# Patient Record
Sex: FEMALE | Race: ASIAN | NOT HISPANIC OR LATINO | URBAN - METROPOLITAN AREA
[De-identification: names, ages, dates, MRNs, and addresses within clinical notes are randomized per-mention and may not be internally consistent; named-entity substitution may affect disease eponyms.]

---

## 2019-05-02 ENCOUNTER — EMERGENCY (EMERGENCY)
Facility: HOSPITAL | Age: 24
LOS: 1 days | Discharge: HOME | End: 2019-05-02
Attending: STUDENT IN AN ORGANIZED HEALTH CARE EDUCATION/TRAINING PROGRAM | Admitting: STUDENT IN AN ORGANIZED HEALTH CARE EDUCATION/TRAINING PROGRAM
Payer: MEDICAID

## 2019-05-02 VITALS
OXYGEN SATURATION: 100 % | SYSTOLIC BLOOD PRESSURE: 109 MMHG | RESPIRATION RATE: 14 BRPM | DIASTOLIC BLOOD PRESSURE: 71 MMHG | WEIGHT: 106.04 LBS | TEMPERATURE: 98 F | HEART RATE: 85 BPM

## 2019-05-02 PROBLEM — Z00.00 ENCOUNTER FOR PREVENTIVE HEALTH EXAMINATION: Status: ACTIVE | Noted: 2019-05-02

## 2019-05-02 LAB
ALBUMIN SERPL ELPH-MCNC: 4.3 G/DL — SIGNIFICANT CHANGE UP (ref 3.3–5)
ALP SERPL-CCNC: 72 U/L — SIGNIFICANT CHANGE UP (ref 40–120)
ALT FLD-CCNC: 10 U/L — SIGNIFICANT CHANGE UP (ref 4–33)
ANION GAP SERPL CALC-SCNC: 11 MMO/L — SIGNIFICANT CHANGE UP (ref 7–14)
APTT BLD: 28.3 SEC — SIGNIFICANT CHANGE UP (ref 27.5–36.3)
AST SERPL-CCNC: 14 U/L — SIGNIFICANT CHANGE UP (ref 4–32)
BILIRUB SERPL-MCNC: < 0.2 MG/DL — LOW (ref 0.2–1.2)
BLD GP AB SCN SERPL QL: NEGATIVE — SIGNIFICANT CHANGE UP
BUN SERPL-MCNC: 13 MG/DL — SIGNIFICANT CHANGE UP (ref 7–23)
CALCIUM SERPL-MCNC: 9.3 MG/DL — SIGNIFICANT CHANGE UP (ref 8.4–10.5)
CHLORIDE SERPL-SCNC: 105 MMOL/L — SIGNIFICANT CHANGE UP (ref 98–107)
CO2 SERPL-SCNC: 25 MMOL/L — SIGNIFICANT CHANGE UP (ref 22–31)
CREAT SERPL-MCNC: 0.54 MG/DL — SIGNIFICANT CHANGE UP (ref 0.5–1.3)
GLUCOSE SERPL-MCNC: 72 MG/DL — SIGNIFICANT CHANGE UP (ref 70–99)
HCG SERPL-ACNC: < 5 MIU/ML — SIGNIFICANT CHANGE UP
HCT VFR BLD CALC: 36.2 % — SIGNIFICANT CHANGE UP (ref 34.5–45)
HGB BLD-MCNC: 11.2 G/DL — LOW (ref 11.5–15.5)
INR BLD: 0.97 — SIGNIFICANT CHANGE UP (ref 0.88–1.17)
MCHC RBC-ENTMCNC: 27.9 PG — SIGNIFICANT CHANGE UP (ref 27–34)
MCHC RBC-ENTMCNC: 30.9 % — LOW (ref 32–36)
MCV RBC AUTO: 90 FL — SIGNIFICANT CHANGE UP (ref 80–100)
NRBC # FLD: 0 K/UL — SIGNIFICANT CHANGE UP (ref 0–0)
PLATELET # BLD AUTO: 297 K/UL — SIGNIFICANT CHANGE UP (ref 150–400)
PMV BLD: 10.6 FL — SIGNIFICANT CHANGE UP (ref 7–13)
POTASSIUM SERPL-MCNC: 4.3 MMOL/L — SIGNIFICANT CHANGE UP (ref 3.5–5.3)
POTASSIUM SERPL-SCNC: 4.3 MMOL/L — SIGNIFICANT CHANGE UP (ref 3.5–5.3)
PROT SERPL-MCNC: 7.3 G/DL — SIGNIFICANT CHANGE UP (ref 6–8.3)
PROTHROM AB SERPL-ACNC: 11.1 SEC — SIGNIFICANT CHANGE UP (ref 9.8–13.1)
RBC # BLD: 4.02 M/UL — SIGNIFICANT CHANGE UP (ref 3.8–5.2)
RBC # FLD: 12.9 % — SIGNIFICANT CHANGE UP (ref 10.3–14.5)
RH IG SCN BLD-IMP: POSITIVE — SIGNIFICANT CHANGE UP
SODIUM SERPL-SCNC: 141 MMOL/L — SIGNIFICANT CHANGE UP (ref 135–145)
WBC # BLD: 8.17 K/UL — SIGNIFICANT CHANGE UP (ref 3.8–10.5)
WBC # FLD AUTO: 8.17 K/UL — SIGNIFICANT CHANGE UP (ref 3.8–10.5)

## 2019-05-02 PROCEDURE — 99284 EMERGENCY DEPT VISIT MOD MDM: CPT

## 2019-05-02 PROCEDURE — 76856 US EXAM PELVIC COMPLETE: CPT | Mod: 26

## 2019-05-02 RX ORDER — SODIUM CHLORIDE 9 MG/ML
1000 INJECTION INTRAMUSCULAR; INTRAVENOUS; SUBCUTANEOUS ONCE
Qty: 0 | Refills: 0 | Status: COMPLETED | OUTPATIENT
Start: 2019-05-02 | End: 2019-05-02

## 2019-05-02 RX ADMIN — SODIUM CHLORIDE 1000 MILLILITER(S): 9 INJECTION INTRAMUSCULAR; INTRAVENOUS; SUBCUTANEOUS at 11:44

## 2019-05-02 NOTE — ED PROVIDER NOTE - OBJECTIVE STATEMENT
22 yo female with PCOS and thyroid disorder (on Metformin) presents to ED for evaluation of heavy vaginal bleeding since 4/20. Patient reports she has been having irregular periods over past 2 months. States last month, period came 3 days late and was unusually light flow. Reports that this month, period came 15 days late and is unusually heavy. Denies chest pain, shortness of breath, dizziness. Denies nausea, vomiting. Reports +abd cramps, like menstrual cramps. Denies changes in bowel movements. Denies fevers or chills. Patient reports that she had an US last month in gyn office and was told she had simple cysts on her ovaries. Pt reports she called her gyn office today to try to be evaluated for current symptoms and was told to come to ED for evaluation because she wouldn't be able to be seen by gyn for some time.

## 2019-05-02 NOTE — ED PROVIDER NOTE - NS ED ROS FT
Constitutional: no fevers or chills  Cardiac: no palpitations, chest pain  Lungs: no shortness of breath, wheezes  Abd: nausea, vomiting, diarrhea  Genitourinary: no dysuria, increased urinary frequency, hematuria  Neurology: no sensorimotor deficits, no dizziness, no headache, no visual changes  Skin: no rashes

## 2019-05-02 NOTE — ED PROVIDER NOTE - NSFOLLOWUPINSTRUCTIONS_ED_ALL_ED_FT
Please return to Emergency Department immediately for any new, concerning, or worsening symptoms.   Please follow-up with gynecologist as recommended.

## 2019-05-02 NOTE — ED PROVIDER NOTE - CLINICAL SUMMARY MEDICAL DECISION MAKING FREE TEXT BOX
Young female with PMH PCOS presents to ED for evaluation of irregular, heavy menses, h/o cysts. Will check cbc, cmp, bquant, type and screen, coags, US transvaginal to check for hemorrhagic cysts. Patient doesn't have any signs of symptomatic anemia at this time, but states flow has been very heavy. Will add fluids for hydration and will reassess.

## 2019-05-02 NOTE — ED PROVIDER NOTE - PHYSICAL EXAMINATION
Gen: no acute distress, well appearing, awake, alert and oriented x 3  Cardiac: regular rate and rhythm, +S1S2  Pulm: Clear to auscultation bilaterally, equal air entry bilaterally  Abd: soft, nontender, nondistended, no guarding, rebound, neg Hines's sign, neg McBurney's point ttp  Back: neg CVA ttp, nontender spine

## 2023-03-03 ENCOUNTER — APPOINTMENT (OUTPATIENT)
Dept: HUMAN REPRODUCTION | Facility: CLINIC | Age: 28
End: 2023-03-03

## 2023-09-10 ENCOUNTER — NON-APPOINTMENT (OUTPATIENT)
Age: 28
End: 2023-09-10

## 2023-09-14 ENCOUNTER — APPOINTMENT (OUTPATIENT)
Dept: ORTHOPEDIC SURGERY | Facility: CLINIC | Age: 28
End: 2023-09-14
Payer: COMMERCIAL

## 2023-09-14 VITALS — WEIGHT: 139 LBS | HEIGHT: 57 IN | BODY MASS INDEX: 29.99 KG/M2

## 2023-09-14 DIAGNOSIS — S93.491A SPRAIN OF OTHER LIGAMENT OF RIGHT ANKLE, INITIAL ENCOUNTER: ICD-10-CM

## 2023-09-14 DIAGNOSIS — R73.03 PREDIABETES.: ICD-10-CM

## 2023-09-14 PROCEDURE — 73610 X-RAY EXAM OF ANKLE: CPT | Mod: RT

## 2023-09-14 PROCEDURE — 99203 OFFICE O/P NEW LOW 30 MIN: CPT

## 2024-08-15 ENCOUNTER — APPOINTMENT (OUTPATIENT)
Dept: ENDOCRINOLOGY | Facility: CLINIC | Age: 29
End: 2024-08-15
Payer: COMMERCIAL

## 2024-08-15 VITALS
WEIGHT: 135.13 LBS | HEART RATE: 85 BPM | HEIGHT: 57 IN | BODY MASS INDEX: 29.15 KG/M2 | OXYGEN SATURATION: 99 % | DIASTOLIC BLOOD PRESSURE: 62 MMHG | SYSTOLIC BLOOD PRESSURE: 100 MMHG

## 2024-08-15 DIAGNOSIS — R73.03 PREDIABETES.: ICD-10-CM

## 2024-08-15 DIAGNOSIS — E28.2 POLYCYSTIC OVARIAN SYNDROME: ICD-10-CM

## 2024-08-15 DIAGNOSIS — Z82.49 FAMILY HISTORY OF ISCHEMIC HEART DISEASE AND OTHER DISEASES OF THE CIRCULATORY SYSTEM: ICD-10-CM

## 2024-08-15 DIAGNOSIS — E55.9 VITAMIN D DEFICIENCY, UNSPECIFIED: ICD-10-CM

## 2024-08-15 DIAGNOSIS — R79.89 OTHER SPECIFIED ABNORMAL FINDINGS OF BLOOD CHEMISTRY: ICD-10-CM

## 2024-08-15 DIAGNOSIS — Z83.49 FAMILY HISTORY OF OTHER ENDOCRINE, NUTRITIONAL AND METABOLIC DISEASES: ICD-10-CM

## 2024-08-15 DIAGNOSIS — Z3A.11 11 WEEKS GESTATION OF PREGNANCY: ICD-10-CM

## 2024-08-15 DIAGNOSIS — Z83.3 FAMILY HISTORY OF DIABETES MELLITUS: ICD-10-CM

## 2024-08-15 LAB
GLUCOSE BLDC GLUCOMTR-MCNC: 95
HBA1C MFR BLD HPLC: 5.3

## 2024-08-15 PROCEDURE — 36415 COLL VENOUS BLD VENIPUNCTURE: CPT

## 2024-08-15 PROCEDURE — 99204 OFFICE O/P NEW MOD 45 MIN: CPT

## 2024-08-15 PROCEDURE — G2211 COMPLEX E/M VISIT ADD ON: CPT | Mod: NC

## 2024-08-15 PROCEDURE — 82962 GLUCOSE BLOOD TEST: CPT

## 2024-08-15 PROCEDURE — 83036 HEMOGLOBIN GLYCOSYLATED A1C: CPT | Mod: QW

## 2024-08-15 NOTE — HISTORY OF PRESENT ILLNESS
[FreeTextEntry1] : Ms. CARMONA is a 28-year-old female who presents for initial endocrine evaluation. She presents with regard to a history of Nodular Thyroid, Hypothyroidism, and PCOS. She is currently 11 weeks gestational with due date of 3/1/25- followed by GYN NP Luz Marina Cade.   The patient reports that around 2018, she was taking a US class when she was noted to have a possible thyroid nodule. She then consulted with an endocrinologist and has subsequent FNA with benign results. She states that she did have f/u Thyroid US last week at [facility] and results are pending.   Regarding her thyroid status, she states that during a routine check with OB/GYN, she was found to have elevated TSH at 5.7 and was started on Levothyroxine 100mcg daily. She only took the medication for 2 weeks, and it was discontinued last Monday (off x ~10 days) due to TSH level low at 0.07.   She denies any temperature intolerance, or significant weight changes. She in addition denies any palpitations, tremors, anxiousness, change in bowel habits or significant change in moods.  Regarding the PCOS, the patient states that she was diagnosed after remarkable US and hormonal w/u.  She notes that in 2015, she had labs that showed a false positive for pregnancy (had never has sexual intercourse), and this along with mildly irregular menses prompted her to be evaluated by GYN.   Patient reports fairly regular menses, but does skip 1 month at least once a year.  She denies any hair thinning, hirsutism, or acne.  Weight has been stable over the years.   ROS: fatigue (prior to pregnancy), frequent urination, intermittent mild abd cramping   FHx: Hypothyroid (mother), DM (mother and father), HTN (mother), Pre-DM (siblings)  Additional medical history includes that of Prediabetes, depression, anxiety  POCT A1C returned today at %.  POCT glucose today at 95 mg/dL.  Surgeries: none  Allergies: none  Medications: none  Supplements: prenatal 2 tabs daily, vitamin C (stared due to cold sx), vitamin D3 2,000 IU ~1-3x weekly (started one week ago due to low D level), Probiotics  SocHx: intermittently smoked cigarretes in the past. Denies alcohol use

## 2024-08-15 NOTE — HISTORY OF PRESENT ILLNESS
[FreeTextEntry1] : Ms. CARMONA is a 28-year-old female who presents for initial endocrine evaluation. She presents with regard to a history of Nodular Thyroid, Hypothyroidism, and PCOS. She is currently 11 weeks gestational with due date of 3/1/25- followed by GYN NP Luz Marina Cade.   The patient reports that around 2018, she was taking a US class when she was noted to have a possible thyroid nodule. She then consulted with an endocrinologist and has subsequent FNA with benign results. She states that she did have f/u Thyroid US last week at [facility] and results are pending.   Regarding her thyroid status, she states that during a routine check with OB/GYN, she was found to have elevated TSH at 5.7 and was started on Levothyroxine 100mcg daily. She only took the medication for 2 weeks, and it was discontinued last Monday (off x ~10 days) due to TSH level low at 0.07.   She denies any temperature intolerance, or significant weight changes. She in addition denies any palpitations, tremors, anxiousness, change in bowel habits or significant change in moods.  Regarding the PCOS, the patient states that she was diagnosed after remarkable US and hormonal w/u.  She notes that in 2015, she had labs that showed a false positive for pregnancy (had never has sexual intercourse), and this along with mildly irregular menses prompted her to be evaluated by GYN.   Patient reports fairly regular menses, but does skip 1 month at least once a year.  She denies any hair thinning, hirsutism, or acne.  Weight has been stable over the years.   ROS: fatigue (prior to pregnancy), frequent urination, intermittent mild abd cramping   FHx: Hypothyroid (mother), DM (mother and father), HTN (mother), Pre-DM (siblings)  Additional medical history includes that of Prediabetes, depression, anxiety  POCT A1C returned today at %.  POCT glucose today at 95 mg/dL.  Surgeries: none  Allergies: none  Medications: none  Supplements: prenatal 2 tabs daily, vitamin C (stared due to cold sx), vitamin D3 2,000 IU ~1-3x weekly (started one week ago due to low D level), Probiotics  SocHx: intermittently smoked cigarretes in the past. Denies alcohol use

## 2024-08-15 NOTE — ADDENDUM
[FreeTextEntry1] : Blood will be drawn in office today.  This note was written by Dayanara Che on 08/15/2024 acting as medical scribe for Dr. Robert Vernon. I, Dr. Robert Vernon, have read and attest that all the information, medical decision making and discharge instructions within are true and accurate.

## 2024-08-16 DIAGNOSIS — E03.9 HYPOTHYROIDISM, UNSPECIFIED: ICD-10-CM

## 2024-08-16 LAB
DHEA-S SERPL-MCNC: 204 UG/DL
FOLATE SERPL-MCNC: >20 NG/ML
SHBG SERPL-SCNC: 117 NMOL/L
T3FREE SERPL-MCNC: 2.97 PG/ML
T3FREE SERPL-MCNC: 3.15 PG/ML
T4 FREE SERPL-MCNC: 1.3 NG/DL
T4 FREE SERPL-MCNC: 1.3 NG/DL
THYROGLOB AB SERPL-ACNC: <20 IU/ML
THYROPEROXIDASE AB SERPL IA-ACNC: 64.5 IU/ML
TSH SERPL-ACNC: 0.2 UIU/ML
TSH SERPL-ACNC: 0.21 UIU/ML
VIT B12 SERPL-MCNC: 781 PG/ML

## 2024-08-19 LAB
ALBUMIN SERPL ELPH-MCNC: 4.2 G/DL
ALP BLD-CCNC: 67 U/L
ALT SERPL-CCNC: 12 U/L
ANION GAP SERPL CALC-SCNC: 16 MMOL/L
AST SERPL-CCNC: 18 U/L
BILIRUB SERPL-MCNC: <0.2 MG/DL
BUN SERPL-MCNC: 5 MG/DL
CALCIUM SERPL-MCNC: 10 MG/DL
CHLORIDE SERPL-SCNC: 105 MMOL/L
CO2 SERPL-SCNC: 20 MMOL/L
CREAT SERPL-MCNC: 0.57 MG/DL
EGFR: 125 ML/MIN/1.73M2
GLUCOSE SERPL-MCNC: 89 MG/DL
POTASSIUM SERPL-SCNC: 3.8 MMOL/L
PROT SERPL-MCNC: 7.1 G/DL
SODIUM SERPL-SCNC: 141 MMOL/L
TESTOST FREE SERPL-MCNC: 0.4 PG/ML
TESTOST SERPL-MCNC: 64.6 NG/DL

## 2024-08-20 ENCOUNTER — APPOINTMENT (OUTPATIENT)
Dept: ANTEPARTUM | Facility: CLINIC | Age: 29
End: 2024-08-20
Payer: COMMERCIAL

## 2024-08-20 ENCOUNTER — LABORATORY RESULT (OUTPATIENT)
Age: 29
End: 2024-08-20

## 2024-08-20 ENCOUNTER — ASOB RESULT (OUTPATIENT)
Age: 29
End: 2024-08-20

## 2024-08-20 PROCEDURE — 76801 OB US < 14 WKS SINGLE FETUS: CPT | Mod: 59

## 2024-08-20 PROCEDURE — 76813 OB US NUCHAL MEAS 1 GEST: CPT

## 2024-08-20 PROCEDURE — 36415 COLL VENOUS BLD VENIPUNCTURE: CPT

## 2024-08-23 PROBLEM — E03.9 HYPOTHYROIDISM, UNSPECIFIED TYPE: Status: ACTIVE | Noted: 2024-08-15

## 2024-08-27 ENCOUNTER — NON-APPOINTMENT (OUTPATIENT)
Age: 29
End: 2024-08-27

## 2024-09-18 DIAGNOSIS — E03.9 HYPOTHYROIDISM, UNSPECIFIED: ICD-10-CM

## 2024-09-18 RX ORDER — LEVOTHYROXINE SODIUM 0.05 MG/1
50 TABLET ORAL DAILY
Qty: 90 | Refills: 1 | Status: ACTIVE | COMMUNITY
Start: 2024-09-18 | End: 1900-01-01

## 2024-10-15 ENCOUNTER — ASOB RESULT (OUTPATIENT)
Age: 29
End: 2024-10-15

## 2024-10-15 ENCOUNTER — APPOINTMENT (OUTPATIENT)
Dept: ANTEPARTUM | Facility: CLINIC | Age: 29
End: 2024-10-15
Payer: COMMERCIAL

## 2024-10-15 PROCEDURE — 76805 OB US >/= 14 WKS SNGL FETUS: CPT

## 2024-10-22 DIAGNOSIS — E03.9 HYPOTHYROIDISM, UNSPECIFIED: ICD-10-CM

## 2024-11-18 ENCOUNTER — NON-APPOINTMENT (OUTPATIENT)
Age: 29
End: 2024-11-18

## 2024-11-25 ENCOUNTER — ASOB RESULT (OUTPATIENT)
Age: 29
End: 2024-11-25

## 2024-11-25 ENCOUNTER — TRANSCRIPTION ENCOUNTER (OUTPATIENT)
Age: 29
End: 2024-11-25

## 2024-11-25 ENCOUNTER — APPOINTMENT (OUTPATIENT)
Dept: MATERNAL FETAL MEDICINE | Facility: CLINIC | Age: 29
End: 2024-11-25
Payer: COMMERCIAL

## 2024-11-25 DIAGNOSIS — E03.9 HYPOTHYROIDISM, UNSPECIFIED: ICD-10-CM

## 2024-11-25 PROCEDURE — G0108 DIAB MANAGE TRN  PER INDIV: CPT | Mod: 95

## 2024-11-25 PROCEDURE — 98960 EDU&TRN PT SELF-MGMT NQHP 1: CPT

## 2024-12-09 ENCOUNTER — ASOB RESULT (OUTPATIENT)
Age: 29
End: 2024-12-09

## 2024-12-09 ENCOUNTER — APPOINTMENT (OUTPATIENT)
Dept: ANTEPARTUM | Facility: CLINIC | Age: 29
End: 2024-12-09
Payer: COMMERCIAL

## 2024-12-09 ENCOUNTER — APPOINTMENT (OUTPATIENT)
Dept: MATERNAL FETAL MEDICINE | Facility: CLINIC | Age: 29
End: 2024-12-09
Payer: COMMERCIAL

## 2024-12-09 PROCEDURE — 76816 OB US FOLLOW-UP PER FETUS: CPT

## 2024-12-09 PROCEDURE — 76820 UMBILICAL ARTERY ECHO: CPT | Mod: 59

## 2024-12-09 PROCEDURE — G0108 DIAB MANAGE TRN  PER INDIV: CPT | Mod: 95

## 2024-12-09 PROCEDURE — 98960 EDU&TRN PT SELF-MGMT NQHP 1: CPT

## 2024-12-09 PROCEDURE — 99212 OFFICE O/P EST SF 10 MIN: CPT | Mod: 25

## 2024-12-17 ENCOUNTER — APPOINTMENT (OUTPATIENT)
Dept: ANTEPARTUM | Facility: CLINIC | Age: 29
End: 2024-12-17
Payer: COMMERCIAL

## 2024-12-17 ENCOUNTER — ASOB RESULT (OUTPATIENT)
Age: 29
End: 2024-12-17

## 2024-12-17 PROCEDURE — 76819 FETAL BIOPHYS PROFIL W/O NST: CPT

## 2024-12-17 PROCEDURE — 76820 UMBILICAL ARTERY ECHO: CPT

## 2024-12-19 ENCOUNTER — APPOINTMENT (OUTPATIENT)
Dept: MATERNAL FETAL MEDICINE | Facility: CLINIC | Age: 29
End: 2024-12-19
Payer: COMMERCIAL

## 2024-12-19 ENCOUNTER — ASOB RESULT (OUTPATIENT)
Age: 29
End: 2024-12-19

## 2024-12-19 PROCEDURE — G0108 DIAB MANAGE TRN  PER INDIV: CPT | Mod: 95

## 2024-12-19 PROCEDURE — 98960 EDU&TRN PT SELF-MGMT NQHP 1: CPT

## 2024-12-23 DIAGNOSIS — E03.9 HYPOTHYROIDISM, UNSPECIFIED: ICD-10-CM

## 2024-12-24 ENCOUNTER — APPOINTMENT (OUTPATIENT)
Dept: ANTEPARTUM | Facility: CLINIC | Age: 29
End: 2024-12-24
Payer: COMMERCIAL

## 2024-12-24 ENCOUNTER — ASOB RESULT (OUTPATIENT)
Age: 29
End: 2024-12-24

## 2024-12-24 PROCEDURE — 76820 UMBILICAL ARTERY ECHO: CPT

## 2024-12-24 PROCEDURE — 76819 FETAL BIOPHYS PROFIL W/O NST: CPT

## 2024-12-31 ENCOUNTER — ASOB RESULT (OUTPATIENT)
Age: 29
End: 2024-12-31

## 2024-12-31 ENCOUNTER — APPOINTMENT (OUTPATIENT)
Dept: ANTEPARTUM | Facility: CLINIC | Age: 29
End: 2024-12-31
Payer: COMMERCIAL

## 2024-12-31 PROCEDURE — 76820 UMBILICAL ARTERY ECHO: CPT | Mod: 59

## 2024-12-31 PROCEDURE — 76819 FETAL BIOPHYS PROFIL W/O NST: CPT

## 2024-12-31 PROCEDURE — 76816 OB US FOLLOW-UP PER FETUS: CPT

## 2025-01-06 ENCOUNTER — APPOINTMENT (OUTPATIENT)
Dept: MATERNAL FETAL MEDICINE | Facility: CLINIC | Age: 30
End: 2025-01-06
Payer: COMMERCIAL

## 2025-01-06 ENCOUNTER — ASOB RESULT (OUTPATIENT)
Age: 30
End: 2025-01-06

## 2025-01-06 PROCEDURE — G0108 DIAB MANAGE TRN  PER INDIV: CPT | Mod: 95

## 2025-01-07 ENCOUNTER — ASOB RESULT (OUTPATIENT)
Age: 30
End: 2025-01-07

## 2025-01-07 ENCOUNTER — APPOINTMENT (OUTPATIENT)
Dept: ANTEPARTUM | Facility: CLINIC | Age: 30
End: 2025-01-07
Payer: COMMERCIAL

## 2025-01-07 PROCEDURE — 76820 UMBILICAL ARTERY ECHO: CPT

## 2025-01-07 PROCEDURE — 76819 FETAL BIOPHYS PROFIL W/O NST: CPT

## 2025-01-14 ENCOUNTER — APPOINTMENT (OUTPATIENT)
Dept: ANTEPARTUM | Facility: CLINIC | Age: 30
End: 2025-01-14
Payer: COMMERCIAL

## 2025-01-14 ENCOUNTER — ASOB RESULT (OUTPATIENT)
Age: 30
End: 2025-01-14

## 2025-01-14 PROCEDURE — 76820 UMBILICAL ARTERY ECHO: CPT

## 2025-01-14 PROCEDURE — 76819 FETAL BIOPHYS PROFIL W/O NST: CPT

## 2025-01-22 ENCOUNTER — ASOB RESULT (OUTPATIENT)
Age: 30
End: 2025-01-22

## 2025-01-22 ENCOUNTER — APPOINTMENT (OUTPATIENT)
Dept: MATERNAL FETAL MEDICINE | Facility: CLINIC | Age: 30
End: 2025-01-22

## 2025-01-22 PROCEDURE — 98960 EDU&TRN PT SELF-MGMT NQHP 1: CPT | Mod: 95

## 2025-01-23 ENCOUNTER — ASOB RESULT (OUTPATIENT)
Age: 30
End: 2025-01-23

## 2025-01-23 ENCOUNTER — APPOINTMENT (OUTPATIENT)
Dept: ANTEPARTUM | Facility: CLINIC | Age: 30
End: 2025-01-23

## 2025-01-23 PROCEDURE — 76816 OB US FOLLOW-UP PER FETUS: CPT

## 2025-01-23 PROCEDURE — 76819 FETAL BIOPHYS PROFIL W/O NST: CPT | Mod: 59

## 2025-01-23 PROCEDURE — 99214 OFFICE O/P EST MOD 30 MIN: CPT | Mod: 25

## 2025-01-23 PROCEDURE — 76820 UMBILICAL ARTERY ECHO: CPT | Mod: 59

## 2025-01-28 ENCOUNTER — ASOB RESULT (OUTPATIENT)
Age: 30
End: 2025-01-28

## 2025-01-28 ENCOUNTER — APPOINTMENT (OUTPATIENT)
Dept: ANTEPARTUM | Facility: CLINIC | Age: 30
End: 2025-01-28
Payer: COMMERCIAL

## 2025-01-28 PROCEDURE — 76818 FETAL BIOPHYS PROFILE W/NST: CPT

## 2025-01-28 PROCEDURE — 76820 UMBILICAL ARTERY ECHO: CPT

## 2025-02-03 DIAGNOSIS — E03.9 HYPOTHYROIDISM, UNSPECIFIED: ICD-10-CM

## 2025-02-04 ENCOUNTER — APPOINTMENT (OUTPATIENT)
Dept: ANTEPARTUM | Facility: CLINIC | Age: 30
End: 2025-02-04

## 2025-02-07 ENCOUNTER — APPOINTMENT (OUTPATIENT)
Dept: ANTEPARTUM | Facility: CLINIC | Age: 30
End: 2025-02-07
Payer: COMMERCIAL

## 2025-02-07 ENCOUNTER — ASOB RESULT (OUTPATIENT)
Age: 30
End: 2025-02-07

## 2025-02-07 PROCEDURE — 76818 FETAL BIOPHYS PROFILE W/NST: CPT

## 2025-02-07 PROCEDURE — 76820 UMBILICAL ARTERY ECHO: CPT

## 2025-02-11 ENCOUNTER — APPOINTMENT (OUTPATIENT)
Dept: ANTEPARTUM | Facility: CLINIC | Age: 30
End: 2025-02-11

## 2025-02-11 ENCOUNTER — ASOB RESULT (OUTPATIENT)
Age: 30
End: 2025-02-11

## 2025-02-11 ENCOUNTER — APPOINTMENT (OUTPATIENT)
Dept: MATERNAL FETAL MEDICINE | Facility: CLINIC | Age: 30
End: 2025-02-11
Payer: COMMERCIAL

## 2025-02-11 PROCEDURE — 98960 EDU&TRN PT SELF-MGMT NQHP 1: CPT | Mod: 95

## 2025-02-14 ENCOUNTER — ASOB RESULT (OUTPATIENT)
Age: 30
End: 2025-02-14

## 2025-02-14 ENCOUNTER — APPOINTMENT (OUTPATIENT)
Dept: ANTEPARTUM | Facility: CLINIC | Age: 30
End: 2025-02-14
Payer: COMMERCIAL

## 2025-02-14 PROCEDURE — 76820 UMBILICAL ARTERY ECHO: CPT | Mod: 59

## 2025-02-14 PROCEDURE — 76816 OB US FOLLOW-UP PER FETUS: CPT

## 2025-02-14 PROCEDURE — 76818 FETAL BIOPHYS PROFILE W/NST: CPT

## 2025-02-21 ENCOUNTER — APPOINTMENT (OUTPATIENT)
Dept: ANTEPARTUM | Facility: CLINIC | Age: 30
End: 2025-02-21

## 2025-02-21 ENCOUNTER — ASOB RESULT (OUTPATIENT)
Age: 30
End: 2025-02-21

## 2025-02-21 PROCEDURE — 76820 UMBILICAL ARTERY ECHO: CPT

## 2025-02-21 PROCEDURE — 76818 FETAL BIOPHYS PROFILE W/NST: CPT

## 2025-02-22 ENCOUNTER — INPATIENT (INPATIENT)
Facility: HOSPITAL | Age: 30
LOS: 4 days | Discharge: HOME | End: 2025-02-27
Attending: STUDENT IN AN ORGANIZED HEALTH CARE EDUCATION/TRAINING PROGRAM | Admitting: STUDENT IN AN ORGANIZED HEALTH CARE EDUCATION/TRAINING PROGRAM
Payer: COMMERCIAL

## 2025-02-22 VITALS
TEMPERATURE: 98 F | RESPIRATION RATE: 17 BRPM | SYSTOLIC BLOOD PRESSURE: 128 MMHG | HEART RATE: 98 BPM | DIASTOLIC BLOOD PRESSURE: 75 MMHG

## 2025-02-22 LAB
BASOPHILS # BLD AUTO: 0.05 K/UL — SIGNIFICANT CHANGE UP (ref 0–0.2)
BASOPHILS NFR BLD AUTO: 0.4 % — SIGNIFICANT CHANGE UP (ref 0–2)
BLD GP AB SCN SERPL QL: NEGATIVE — SIGNIFICANT CHANGE UP
EOSINOPHIL # BLD AUTO: 0.09 K/UL — SIGNIFICANT CHANGE UP (ref 0–0.5)
EOSINOPHIL NFR BLD AUTO: 0.7 % — SIGNIFICANT CHANGE UP (ref 0–6)
GLUCOSE BLDC GLUCOMTR-MCNC: 112 MG/DL — HIGH (ref 70–99)
GLUCOSE BLDC GLUCOMTR-MCNC: 93 MG/DL — SIGNIFICANT CHANGE UP (ref 70–99)
HCT VFR BLD CALC: 36.8 % — SIGNIFICANT CHANGE UP (ref 34.5–45)
HGB BLD-MCNC: 12.2 G/DL — SIGNIFICANT CHANGE UP (ref 11.5–15.5)
IANC: 9.31 K/UL — HIGH (ref 1.8–7.4)
IMM GRANULOCYTES NFR BLD AUTO: 0.9 % — SIGNIFICANT CHANGE UP (ref 0–0.9)
LYMPHOCYTES # BLD AUTO: 1.96 K/UL — SIGNIFICANT CHANGE UP (ref 1–3.3)
LYMPHOCYTES # BLD AUTO: 16 % — SIGNIFICANT CHANGE UP (ref 13–44)
MCHC RBC-ENTMCNC: 29.3 PG — SIGNIFICANT CHANGE UP (ref 27–34)
MCHC RBC-ENTMCNC: 33.2 G/DL — SIGNIFICANT CHANGE UP (ref 32–36)
MCV RBC AUTO: 88.5 FL — SIGNIFICANT CHANGE UP (ref 80–100)
MONOCYTES # BLD AUTO: 0.75 K/UL — SIGNIFICANT CHANGE UP (ref 0–0.9)
MONOCYTES NFR BLD AUTO: 6.1 % — SIGNIFICANT CHANGE UP (ref 2–14)
NEUTROPHILS # BLD AUTO: 9.31 K/UL — HIGH (ref 1.8–7.4)
NEUTROPHILS NFR BLD AUTO: 75.9 % — SIGNIFICANT CHANGE UP (ref 43–77)
NRBC # BLD AUTO: 0 K/UL — SIGNIFICANT CHANGE UP (ref 0–0)
NRBC # FLD: 0 K/UL — SIGNIFICANT CHANGE UP (ref 0–0)
NRBC BLD AUTO-RTO: 0 /100 WBCS — SIGNIFICANT CHANGE UP (ref 0–0)
PLATELET # BLD AUTO: 220 K/UL — SIGNIFICANT CHANGE UP (ref 150–400)
RBC # BLD: 4.16 M/UL — SIGNIFICANT CHANGE UP (ref 3.8–5.2)
RBC # FLD: 13.7 % — SIGNIFICANT CHANGE UP (ref 10.3–14.5)
RH IG SCN BLD-IMP: POSITIVE — SIGNIFICANT CHANGE UP
RH IG SCN BLD-IMP: POSITIVE — SIGNIFICANT CHANGE UP
WBC # BLD: 12.27 K/UL — HIGH (ref 3.8–10.5)
WBC # FLD AUTO: 12.27 K/UL — HIGH (ref 3.8–10.5)

## 2025-02-22 RX ORDER — SODIUM CHLORIDE 9 G/1000ML
1000 INJECTION, SOLUTION INTRAVENOUS
Refills: 0 | Status: DISCONTINUED | OUTPATIENT
Start: 2025-02-22 | End: 2025-02-22

## 2025-02-22 RX ORDER — SODIUM CHLORIDE 9 G/1000ML
1000 INJECTION, SOLUTION INTRAVENOUS
Refills: 0 | Status: DISCONTINUED | OUTPATIENT
Start: 2025-02-22 | End: 2025-02-24

## 2025-02-22 RX ORDER — CITRIC ACID/SODIUM CITRATE 300-500 MG
15 SOLUTION, ORAL ORAL EVERY 6 HOURS
Refills: 0 | Status: DISCONTINUED | OUTPATIENT
Start: 2025-02-22 | End: 2025-02-24

## 2025-02-22 RX ORDER — LEVOTHYROXINE SODIUM 300 MCG
50 TABLET ORAL DAILY
Refills: 0 | Status: DISCONTINUED | OUTPATIENT
Start: 2025-02-22 | End: 2025-02-24

## 2025-02-22 RX ORDER — OXYTOCIN-SODIUM CHLORIDE 0.9% IV SOLN 30 UNIT/500ML 30-0.9/5 UT/ML-%
167 SOLUTION INTRAVENOUS
Qty: 30 | Refills: 0 | Status: DISCONTINUED | OUTPATIENT
Start: 2025-02-22 | End: 2025-02-24

## 2025-02-22 RX ADMIN — Medication 1 APPLICATION(S): at 21:35

## 2025-02-22 RX ADMIN — SODIUM CHLORIDE 125 MILLILITER(S): 9 INJECTION, SOLUTION INTRAVENOUS at 21:34

## 2025-02-22 NOTE — OB RN PATIENT PROFILE - FALL HARM RISK - UNIVERSAL INTERVENTIONS
Patient requesting lactulose. Patient states still feels like she needs to go. NP notified.    Bed in lowest position, wheels locked, appropriate side rails in place/Call bell, personal items and telephone in reach/Instruct patient to call for assistance before getting out of bed or chair/Non-slip footwear when patient is out of bed/[place] to call system/Physically safe environment - no spills, clutter or unnecessary equipment/Purposeful Proactive Rounding/Room/bathroom lighting operational, light cord in reach

## 2025-02-22 NOTE — OB RN PATIENT PROFILE - POST PARTUM DEPRESSION SCREEN OB 2
FHT - mod variability, =accels, no decels; reactive  toco - rare ctx    cvx- firm, could not reach internal os    BSUS- confirms vtx, OP    Cervidil placed in posterior fornix no

## 2025-02-22 NOTE — OB PROVIDER H&P - NSHPPHYSICALEXAM_GEN_ALL_CORE
T(C): 36.4 (02-22-25 @ 18:43), Max: 36.4 (02-22-25 @ 18:32)  HR: 98 (02-22-25 @ 18:43) (98 - 98)  BP: 128/75 (02-22-25 @ 18:43) (128/75 - 128/75)  RR: 17 (02-22-25 @ 18:43) (17 - 17)  SpO2: --  Gen: NAD, well-appearing   Abd: Soft, gravid  SVE: 0/0/-3  Bedside sono: vertex  FHT: baseline 140 bpm, moderate variability, +Accels, -decels  [place]: irregular ctx

## 2025-02-22 NOTE — OB PROVIDER H&P - NSLOWPPHRISK_OBGYN_A_OB
No previous uterine incision/[place] Pregnancy/Less than or equal to 4 previous vaginal births/No known bleeding disorder/No history of postpartum hemorrhage

## 2025-02-22 NOTE — OB PROVIDER H&P - ATTENDING COMMENTS
A/P: 29y  at 39.0 weeks GA presents to L&D for IOL for IUGR and GDMA1.    -Admit to L&D  -Consent signed  -Admission labs  -NPO, except ice chips   -Rotating IV fluids (LR/D5LR)  -Labor: Intact. Not in labor. Al irregularly. Induce labor with buccal cytotec and cervical balloon when able.  -Fetus: Reactive tracing. Continuous toco and fetal monitoring.   -GBS: Negative, no GBS ppx required   -Analgesia: epi PRN  -DVT ppx: Ambulate and SCD's while in bed

## 2025-02-22 NOTE — OB PROVIDER H&P - ASSESSMENT
A/P: 29y  at 39.0 weeks GA presents to L&D for IOL for IUGR and GDMA1.    -Admit to L&D  -Consent signed  -Admission labs  -NPO, except ice chips   -Rotating IV fluids (LR/D5LR)  -Labor: Intact. Not in labor. Al irregularly. Induce labor with buccal cytotec and cervical balloon when able.  -Fetus: Reactive tracing. Continuous toco and fetal monitoring.   -GBS: Negative, no GBS ppx required   -Analgesia: epi PRN  -DVT ppx: Ambulate and SCD's while in bed     Discussed with Dr. Ghotra, attending    Corrina Trinh, PGY1

## 2025-02-22 NOTE — OB PROVIDER H&P - HISTORY OF PRESENT ILLNESS
29y  at 39.0 weeks GA presents to L&D for IOL for IUGR and GDMA1. Patient denies vaginal bleeding, contractions and leakage of fluid. She endorses good fetal movement. Denies fevers, chills, nausea and vomiting. No other complaints at this time. Prenatal course is significant for: IUGR (EFW 13%, AC 4%, UAD wnl) as of 25, GDMA1, anemia  JESSICA: 2025    POB: IUGR (EFW 13%, AC 4%, UAD wnl) as of 25  GDMA1, reports FS overall well-controlled  Anemia in pregnancy  PGYN: PCOS; denies fibroids, ovarian cysts, STD hx, or abnormal PAPs   PMH: hypothyroidism  PSH: Denies  SH: Denies EtOH, tobacco and illicit drug use during this pregnancy; feels safe at home   Psych Hx: reports hx of anxiety or depression, reports well-controlled, sees therapist, does not take medication  Meds: PNVs, bASA, Fe, Synthroid 50mcg  Allergies: NKDA    EFW: 2942g    GBS: negative

## 2025-02-22 NOTE — OB PROVIDER LABOR PROGRESS NOTE - ASSESSMENT
P0 IOL for FGR, pregnancy also c/b A1, currently stable  - CB placed  - C/w BC  - Maternal repositioning  - epi PRN    HSinks PGY3

## 2025-02-22 NOTE — OB PROVIDER H&P - LIVING CHILDREN, OB PROFILE
Gurjit Chavez asked Kvng Sandhu this morning to have subsequent work notes from her primary OB and they saw her earlier today (after our appointment) and are seeing her again Thursday  If you could call herto tell her that  Thank you!   Madalyn 0

## 2025-02-23 LAB
GLUCOSE BLDC GLUCOMTR-MCNC: 103 MG/DL — HIGH (ref 70–99)
GLUCOSE BLDC GLUCOMTR-MCNC: 144 MG/DL — HIGH (ref 70–99)
GLUCOSE BLDC GLUCOMTR-MCNC: 75 MG/DL — SIGNIFICANT CHANGE UP (ref 70–99)
GLUCOSE BLDC GLUCOMTR-MCNC: 82 MG/DL — SIGNIFICANT CHANGE UP (ref 70–99)
GLUCOSE BLDC GLUCOMTR-MCNC: 82 MG/DL — SIGNIFICANT CHANGE UP (ref 70–99)
GLUCOSE BLDC GLUCOMTR-MCNC: 94 MG/DL — SIGNIFICANT CHANGE UP (ref 70–99)
T PALLIDUM AB TITR SER: NEGATIVE — SIGNIFICANT CHANGE UP

## 2025-02-23 RX ORDER — SODIUM CHLORIDE 9 G/1000ML
500 INJECTION, SOLUTION INTRAVENOUS ONCE
Refills: 0 | Status: DISCONTINUED | OUTPATIENT
Start: 2025-02-23 | End: 2025-02-24

## 2025-02-23 RX ORDER — OXYTOCIN-SODIUM CHLORIDE 0.9% IV SOLN 30 UNIT/500ML 30-0.9/5 UT/ML-%
1 SOLUTION INTRAVENOUS
Qty: 30 | Refills: 0 | Status: DISCONTINUED | OUTPATIENT
Start: 2025-02-23 | End: 2025-02-24

## 2025-02-23 RX ORDER — OXYTOCIN-SODIUM CHLORIDE 0.9% IV SOLN 30 UNIT/500ML 30-0.9/5 UT/ML-%
SOLUTION INTRAVENOUS
Qty: 30 | Refills: 0 | Status: DISCONTINUED | OUTPATIENT
Start: 2025-02-23 | End: 2025-02-24

## 2025-02-23 RX ORDER — BUTORPHANOL TARTRATE 1 MG/ML
2 INJECTION, SOLUTION INTRAMUSCULAR; INTRAVENOUS ONCE
Refills: 0 | Status: DISCONTINUED | OUTPATIENT
Start: 2025-02-23 | End: 2025-02-23

## 2025-02-23 RX ADMIN — OXYTOCIN-SODIUM CHLORIDE 0.9% IV SOLN 30 UNIT/500ML 2 MILLIUNIT(S)/MIN: 30-0.9/5 SOLUTION at 13:19

## 2025-02-23 RX ADMIN — SODIUM CHLORIDE 125 MILLILITER(S): 9 INJECTION, SOLUTION INTRAVENOUS at 23:18

## 2025-02-23 RX ADMIN — BUTORPHANOL TARTRATE 2 MILLIGRAM(S): 1 INJECTION, SOLUTION INTRAMUSCULAR; INTRAVENOUS at 01:40

## 2025-02-23 RX ADMIN — SODIUM CHLORIDE 125 MILLILITER(S): 9 INJECTION, SOLUTION INTRAVENOUS at 22:05

## 2025-02-23 RX ADMIN — BUTORPHANOL TARTRATE 2 MILLIGRAM(S): 1 INJECTION, SOLUTION INTRAMUSCULAR; INTRAVENOUS at 01:20

## 2025-02-23 RX ADMIN — Medication 50 MICROGRAM(S): at 07:46

## 2025-02-23 RX ADMIN — OXYTOCIN-SODIUM CHLORIDE 0.9% IV SOLN 30 UNIT/500ML 1 MILLIUNIT(S)/MIN: 30-0.9/5 SOLUTION at 19:37

## 2025-02-23 NOTE — OB PROVIDER LABOR PROGRESS NOTE - ASSESSMENT
Vaginal balloon deflated to check  Uterine balloon still in place    d/w attg Dr Miles Peterson  PGY4

## 2025-02-23 NOTE — OB PROVIDER LABOR PROGRESS NOTE - ASSESSMENT
Plan: 29y y/o  @39w2d in stable condition  - Pitocin currently at 2mu/min  - IUPC placed for Pit titration  - Con't IOL with Pitocin  - Continuous EFM, [place]  - Con't IVF    d/w attending physician Dr. Rancho Mann PGY3 Plan: 29y y/o  @39w2d in stable condition  - Pitocin currently at 2mu/min  - IUPC placed for Pit titration  - Con't IOL with Pitocin  - Continuous EFM, [place]  - Con't IVF    d/w attending physician Dr. Rancho Mann PGY3    **1230am update**    - Decelerations now largely variables, recurrent  - Start Amnioinfusion  - Pause Pitocin temporarily to allow for tracing recovery    d/w attending, Dr. Rancho Mann PGY3

## 2025-02-23 NOTE — OB PROVIDER LABOR PROGRESS NOTE - ASSESSMENT
-cervical balloon came out, VE 3/60/-3  -plan to transition to pitocin 2/2  -will not AROM until greater than 5 cm     Bri Pa PGY1  D/w Dr. Aquino

## 2025-02-24 ENCOUNTER — TRANSCRIPTION ENCOUNTER (OUTPATIENT)
Age: 30
End: 2025-02-24

## 2025-02-24 LAB
GLUCOSE BLDC GLUCOMTR-MCNC: 80 MG/DL — SIGNIFICANT CHANGE UP (ref 70–99)
GLUCOSE BLDC GLUCOMTR-MCNC: 86 MG/DL — SIGNIFICANT CHANGE UP (ref 70–99)

## 2025-02-24 RX ORDER — LEVOTHYROXINE SODIUM 300 MCG
50 TABLET ORAL DAILY
Refills: 0 | Status: DISCONTINUED | OUTPATIENT
Start: 2025-02-24 | End: 2025-02-27

## 2025-02-24 RX ORDER — SODIUM CHLORIDE 9 G/1000ML
1000 INJECTION, SOLUTION INTRAVENOUS
Refills: 0 | Status: DISCONTINUED | OUTPATIENT
Start: 2025-02-24 | End: 2025-02-25

## 2025-02-24 RX ORDER — SENNA 187 MG
2 TABLET ORAL AT BEDTIME
Refills: 0 | Status: DISCONTINUED | OUTPATIENT
Start: 2025-02-24 | End: 2025-02-27

## 2025-02-24 RX ORDER — SODIUM CHLORIDE 9 G/1000ML
1000 INJECTION, SOLUTION INTRAVENOUS
Refills: 0 | Status: DISCONTINUED | OUTPATIENT
Start: 2025-02-24 | End: 2025-02-24

## 2025-02-24 RX ORDER — KETOROLAC TROMETHAMINE 30 MG/ML
30 INJECTION, SOLUTION INTRAMUSCULAR; INTRAVENOUS EVERY 6 HOURS
Refills: 0 | Status: DISCONTINUED | OUTPATIENT
Start: 2025-02-24 | End: 2025-02-25

## 2025-02-24 RX ORDER — FERROUS SULFATE 137(45) MG
325 TABLET, EXTENDED RELEASE ORAL DAILY
Refills: 0 | Status: DISCONTINUED | OUTPATIENT
Start: 2025-02-24 | End: 2025-02-27

## 2025-02-24 RX ORDER — OXYCODONE HYDROCHLORIDE 30 MG/1
5 TABLET ORAL ONCE
Refills: 0 | Status: DISCONTINUED | OUTPATIENT
Start: 2025-02-24 | End: 2025-02-27

## 2025-02-24 RX ORDER — SIMETHICONE 80 MG
80 TABLET,CHEWABLE ORAL EVERY 4 HOURS
Refills: 0 | Status: DISCONTINUED | OUTPATIENT
Start: 2025-02-24 | End: 2025-02-27

## 2025-02-24 RX ORDER — CITRIC ACID/SODIUM CITRATE 300-500 MG
30 SOLUTION, ORAL ORAL ONCE
Refills: 0 | Status: DISCONTINUED | OUTPATIENT
Start: 2025-02-24 | End: 2025-02-24

## 2025-02-24 RX ORDER — DEXAMETHASONE 0.5 MG/1
4 TABLET ORAL EVERY 6 HOURS
Refills: 0 | Status: DISCONTINUED | OUTPATIENT
Start: 2025-02-24 | End: 2025-02-24

## 2025-02-24 RX ORDER — PRENATAL 136/IRON/FOLIC ACID 27 MG-1 MG
1 TABLET ORAL DAILY
Refills: 0 | Status: DISCONTINUED | OUTPATIENT
Start: 2025-02-24 | End: 2025-02-27

## 2025-02-24 RX ORDER — HEPARIN SODIUM 1000 [USP'U]/ML
5000 INJECTION INTRAVENOUS; SUBCUTANEOUS EVERY 12 HOURS
Refills: 0 | Status: DISCONTINUED | OUTPATIENT
Start: 2025-02-24 | End: 2025-02-27

## 2025-02-24 RX ORDER — OXYCODONE HYDROCHLORIDE 30 MG/1
5 TABLET ORAL
Refills: 0 | Status: COMPLETED | OUTPATIENT
Start: 2025-02-24 | End: 2025-03-03

## 2025-02-24 RX ORDER — CALCIUM CARBONATE 750 MG/1
2 TABLET ORAL ONCE
Refills: 0 | Status: DISCONTINUED | OUTPATIENT
Start: 2025-02-24 | End: 2025-02-24

## 2025-02-24 RX ORDER — OXYTOCIN-SODIUM CHLORIDE 0.9% IV SOLN 30 UNIT/500ML 30-0.9/5 UT/ML-%
42 SOLUTION INTRAVENOUS
Qty: 30 | Refills: 0 | Status: DISCONTINUED | OUTPATIENT
Start: 2025-02-24 | End: 2025-02-24

## 2025-02-24 RX ORDER — ACETAMINOPHEN 500 MG/5ML
975 LIQUID (ML) ORAL
Refills: 0 | Status: DISCONTINUED | OUTPATIENT
Start: 2025-02-24 | End: 2025-02-27

## 2025-02-24 RX ORDER — MODIFIED LANOLIN 100 %
1 CREAM (GRAM) TOPICAL EVERY 6 HOURS
Refills: 0 | Status: DISCONTINUED | OUTPATIENT
Start: 2025-02-24 | End: 2025-02-27

## 2025-02-24 RX ORDER — ACETAMINOPHEN 500 MG/5ML
1000 LIQUID (ML) ORAL ONCE
Refills: 0 | Status: COMPLETED | OUTPATIENT
Start: 2025-02-24 | End: 2025-02-24

## 2025-02-24 RX ORDER — CLOSTRIDIUM TETANI TOXOID ANTIGEN (FORMALDEHYDE INACTIVATED), CORYNEBACTERIUM DIPHTHERIAE TOXOID ANTIGEN (FORMALDEHYDE INACTIVATED), BORDETELLA PERTUSSIS TOXOID ANTIGEN (GLUTARALDEHYDE INACTIVATED), BORDETELLA PERTUSSIS FILAMENTOUS HEMAGGLUTININ ANTIGEN (FORMALDEHYDE INACTIVATED), BORDETELLA PERTUSSIS PERTACTIN ANTIGEN, AND BORDETELLA PERTUSSIS FIMBRIAE 2/3 ANTIGEN 5; 2; 2.5; 5; 3; 5 [LF]/.5ML; [LF]/.5ML; UG/.5ML; UG/.5ML; UG/.5ML; UG/.5ML
0.5 INJECTION, SUSPENSION INTRAMUSCULAR ONCE
Refills: 0 | Status: DISCONTINUED | OUTPATIENT
Start: 2025-02-24 | End: 2025-02-27

## 2025-02-24 RX ORDER — ONDANSETRON HCL/PF 4 MG/2 ML
4 VIAL (ML) INJECTION EVERY 6 HOURS
Refills: 0 | Status: DISCONTINUED | OUTPATIENT
Start: 2025-02-24 | End: 2025-02-24

## 2025-02-24 RX ORDER — NALOXONE HYDROCHLORIDE 0.4 MG/ML
0.1 INJECTION, SOLUTION INTRAMUSCULAR; INTRAVENOUS; SUBCUTANEOUS
Refills: 0 | Status: DISCONTINUED | OUTPATIENT
Start: 2025-02-24 | End: 2025-02-24

## 2025-02-24 RX ORDER — NALBUPHINE HYDROCHLORIDE 10 MG/ML
2.5 INJECTION INTRAMUSCULAR; INTRAVENOUS; SUBCUTANEOUS EVERY 6 HOURS
Refills: 0 | Status: DISCONTINUED | OUTPATIENT
Start: 2025-02-24 | End: 2025-02-24

## 2025-02-24 RX ORDER — DIPHENHYDRAMINE HCL 12.5MG/5ML
25 ELIXIR ORAL EVERY 6 HOURS
Refills: 0 | Status: DISCONTINUED | OUTPATIENT
Start: 2025-02-24 | End: 2025-02-27

## 2025-02-24 RX ORDER — MAGNESIUM HYDROXIDE 400 MG/5ML
30 SUSPENSION ORAL
Refills: 0 | Status: DISCONTINUED | OUTPATIENT
Start: 2025-02-24 | End: 2025-02-27

## 2025-02-24 RX ORDER — OXYCODONE HYDROCHLORIDE 30 MG/1
5 TABLET ORAL ONCE
Refills: 0 | Status: DISCONTINUED | OUTPATIENT
Start: 2025-02-24 | End: 2025-02-24

## 2025-02-24 RX ORDER — IBUPROFEN 200 MG
600 TABLET ORAL EVERY 6 HOURS
Refills: 0 | Status: COMPLETED | OUTPATIENT
Start: 2025-02-24 | End: 2026-01-23

## 2025-02-24 RX ORDER — OXYCODONE HYDROCHLORIDE 30 MG/1
5 TABLET ORAL
Refills: 0 | Status: DISCONTINUED | OUTPATIENT
Start: 2025-02-24 | End: 2025-02-24

## 2025-02-24 RX ADMIN — OXYCODONE HYDROCHLORIDE 5 MILLIGRAM(S): 30 TABLET ORAL at 10:00

## 2025-02-24 RX ADMIN — Medication 400 MILLIGRAM(S): at 08:30

## 2025-02-24 RX ADMIN — Medication 975 MILLIGRAM(S): at 15:48

## 2025-02-24 RX ADMIN — KETOROLAC TROMETHAMINE 30 MILLIGRAM(S): 30 INJECTION, SOLUTION INTRAMUSCULAR; INTRAVENOUS at 19:34

## 2025-02-24 RX ADMIN — Medication 975 MILLIGRAM(S): at 16:46

## 2025-02-24 RX ADMIN — OXYTOCIN-SODIUM CHLORIDE 0.9% IV SOLN 30 UNIT/500ML 42 MILLIUNIT(S)/MIN: 30-0.9/5 SOLUTION at 09:15

## 2025-02-24 RX ADMIN — SODIUM CHLORIDE 83 MILLILITER(S): 9 INJECTION, SOLUTION INTRAVENOUS at 09:15

## 2025-02-24 RX ADMIN — Medication 1 APPLICATION(S): at 04:41

## 2025-02-24 RX ADMIN — Medication 125 MILLILITER(S): at 04:49

## 2025-02-24 RX ADMIN — KETOROLAC TROMETHAMINE 30 MILLIGRAM(S): 30 INJECTION, SOLUTION INTRAMUSCULAR; INTRAVENOUS at 14:00

## 2025-02-24 RX ADMIN — OXYCODONE HYDROCHLORIDE 5 MILLIGRAM(S): 30 TABLET ORAL at 09:16

## 2025-02-24 RX ADMIN — KETOROLAC TROMETHAMINE 30 MILLIGRAM(S): 30 INJECTION, SOLUTION INTRAMUSCULAR; INTRAVENOUS at 13:06

## 2025-02-24 RX ADMIN — Medication 500 MILLILITER(S): at 00:29

## 2025-02-24 RX ADMIN — Medication 125 MILLILITER(S): at 00:37

## 2025-02-24 RX ADMIN — Medication 1000 MILLIGRAM(S): at 09:00

## 2025-02-24 RX ADMIN — Medication 50 MICROGRAM(S): at 11:08

## 2025-02-24 RX ADMIN — HEPARIN SODIUM 5000 UNIT(S): 1000 INJECTION INTRAVENOUS; SUBCUTANEOUS at 19:03

## 2025-02-24 RX ADMIN — SODIUM CHLORIDE 125 MILLILITER(S): 9 INJECTION, SOLUTION INTRAVENOUS at 00:37

## 2025-02-24 RX ADMIN — KETOROLAC TROMETHAMINE 30 MILLIGRAM(S): 30 INJECTION, SOLUTION INTRAMUSCULAR; INTRAVENOUS at 19:04

## 2025-02-24 NOTE — OB RN DELIVERY SUMMARY - NSSELHIDDEN_OBGYN_ALL_OB_FT
[NS_DeliveryAttending1_OBGYN_ALL_OB_FT:[id number]],[NS_DeliveryRN_OBGYN_ALL_OB_FT:[id number]]

## 2025-02-24 NOTE — DISCHARGE NOTE OB - CARE PROVIDER_API CALL
Susanne Loya  Obstetrics and Gynecology  [address] [place] [id number]  Phone: [phone]  Follow Up Time:

## 2025-02-24 NOTE — OB RN DELIVERY SUMMARY - NS_SEPSISRSKCALC_OBGYN_ALL_OB_FT
EOS calculated successfully. EOS Risk Factor: 0.5/1000 live births ([facility] national incidence); GA=39w2d; Temp=98.42; ROM=16.967; GBS='Negative'; Antibiotics='No antibiotics or any antibiotics < 2 hrs prior to birth'

## 2025-02-24 NOTE — DISCHARGE NOTE OB - CARE PLAN
Assessment and plan of treatment:	Routine post  delivery care   1 Principal Discharge DX:	Single delivery by  section  Assessment and plan of treatment:	Routine post  delivery care

## 2025-02-24 NOTE — DISCHARGE NOTE OB - NS MD DC FALL RISK RISK
For information on Fall & Injury Prevention, visit: [email or web address] OR  [email or web address] OR  https://www.cdc.gov/steadi/patient.html

## 2025-02-24 NOTE — DISCHARGE NOTE OB - PATIENT PORTAL LINK FT
You can access the FollowMyHealth Patient Portal offered by [facility] by registering at the following website: [email or web address]. By joining [facility]’s FollowMyHealth portal, you will also be able to view your health information using other applications (apps) compatible with our system.

## 2025-02-24 NOTE — OB RN INTRAOPERATIVE NOTE - NSSELHIDDEN_OBGYN_ALL_OB_FT
[NS_DeliveryAttending1_OBGYN_ALL_OB_FT:[id number]],[NS_DeliveryRN_OBGYN_ALL_OB_FT:[id number]=]

## 2025-02-24 NOTE — OB PROVIDER DELIVERY SUMMARY - NSSELHIDDEN_OBGYN_ALL_OB_FT
[NS_DeliveryAttending1_OBGYN_ALL_OB_FT:[id number]],[NS_DeliveryRN_OBGYN_ALL_OB_FT:[id number]=]

## 2025-02-24 NOTE — DISCHARGE NOTE OB - MATERIALS PROVIDED
Vaccinations/[facility]  Screening Program/  Immunization Record/Bottle Feeding Log/Guide to Postpartum Care/[facility] Hearing Screen Program/Back To Sleep Handout/Shaken Baby Prevention Handout/Breastfeeding Guide and Packet/Birth Certificate Instructions/Discharge Medication Information for Patients and Families Pocket Guide/Tdap Vaccination (VIS Pub Date: 2012)

## 2025-02-24 NOTE — OB PROVIDER LABOR PROGRESS NOTE - ASSESSMENT
29y  @ 39wks/2d here for IOL for IUGR    - s/p buccal cytotec and cervical balloon   - VE 4.5/80/-2  - SROM @ 125p  - Pt with 4 minute prolonged decel  - Per Dr. Vickers, patient for  due to tracing    Discussed with Dr. Rancho Cardoza, PGY-1

## 2025-02-24 NOTE — OB PROVIDER LABOR PROGRESS NOTE - NS_SUBJECTIVE/OBJECTIVE_OBGYN_ALL_OB_FT
Checked to see if balloon in place
Patient seen and examined at bedside for CB placement. Indication and procedure explained to pt with all questions answered to apparent satisfaction. Pt confirms understanding, agrees to proceed.    CB placed without incident; 60 cc NS in uterine, 60 in vaginal. Pt tolerated the procedure well.
Patient seen and examined for progression of labor. Effective epidural in situ.    T(C): 36.8 (02-24-25 @ 02:31), Max: 36.9 (02-23-25 @ 16:30)  HR: 82 (02-24-25 @ 04:21) (65 - 120)  BP: 127/59 (02-24-25 @ 03:34) (90/53 - 152/-)  RR: 16 (02-24-25 @ 02:31) (16 - 18)  SpO2: 100% (02-24-25 @ 04:21) (86% - 100%)
R3 Labor & Delivery Progress Note     Pt seen & examined at bedside for VE due to Cat2 tracing with recurrent late decelerations. Pt otherwise comfortable with epidural, though did not tolerate exam well.     SVE 3/70/-3    T(C): 36.8 (02-24-25 @ 02:31), Max: 36.9 (02-23-25 @ 16:30)  HR: 74 (02-24-25 @ 02:41) (65 - 120)  BP: 125/61 (02-24-25 @ 02:34) (90/53 - 152/-)  RR: 16 (02-24-25 @ 02:31) (16 - 20)  SpO2: 100% (02-24-25 @ 02:41) (86% - 100%)
Reassessed patient in labor course
Pt seen at bedside due to recurrent decelerations after increasing pitocin  Pitocin initially decreased, then discontinued  Same cervical exam  Terbutaline administered due to continued late decels with contractions q 3-5 min, irregular, not improved with repositioning
Patient seen and examined for progression of labor. Effective epidural in situ.    T(C): 36.8 (02-24-25 @ 02:31), Max: 36.9 (02-23-25 @ 16:30)  HR: 130 (02-24-25 @ 05:44) (67 - 130)  BP: 124/58 (02-24-25 @ 04:36) (100/59 - 155/91)  RR: 16 (02-24-25 @ 02:31) (16 - 18)  SpO2: 100% (02-24-25 @ 05:44) (86% - 100%)

## 2025-02-24 NOTE — OB PROVIDER LABOR PROGRESS NOTE - NS_OBIHIFHRDETAILS_OBGYN_ALL_OB_FT
EFM: 145/mod. variability/-accels/+recurrent late decels
140/mod/+accels/+4min prolonged decel
155/mod/+accel/+rare late decel
150/mod jud/+accels/-decels

## 2025-02-24 NOTE — DISCHARGE NOTE OB - BREAST MILK SUPPORTS STABLE NEWBORN BLOOD SUGAR
SURGERY CONSULT NOTE     HISTORY OF PRESENT ILLNESS:  HPI: 51 y/o M with PMH of HTN, CHF (s/p AICD 2016), CAD (s/p CABG 2015), IDDM, PVD s/p stent x4 in RLE with R big toe/second toe amputation (2021) and ESRD on HD via LUE AVF for 6 years now s/p DDRT 7/16/2023 presenting to the ED with infected R 3rd toe and left 1st metatarsal ulcers. Patient follows with Dr. Malone of Vascular Surgery.       Surgery consulted for evaluation of 3rd toe wound.     PAST MEDICAL HISTORY: Diabetes mellitus    Foot ulcer due to secondary DM    Coronary artery disease    Acute on chronic systolic heart failure    H/O kidney transplant        PAST SURGICAL HISTORY: Breast Reduction    Toe amputation status, left    S/P CABG (coronary artery bypass graft)    AICD (automatic cardioverter/defibrillator) present        FAMILY HISTORY: Family history of diabetes mellitus (Father)        SOCIAL HISTORY:    CODE STATUS:     HOME MEDICATIONS:    ALLERGIES: Contrast. (Unknown)      VITAL SIGNS:  ICU Vital Signs Last 24 Hrs  T(C): 36.4 (06 Dec 2023 12:54), Max: 36.4 (06 Dec 2023 10:41)  T(F): 97.6 (06 Dec 2023 12:54), Max: 97.6 (06 Dec 2023 12:54)  HR: 77 (06 Dec 2023 12:54) (77 - 80)  BP: 154/55 (06 Dec 2023 12:54) (143/65 - 154/55)  BP(mean): 83 (06 Dec 2023 12:54) (83 - 83)  ABP: --  ABP(mean): --  RR: 18 (06 Dec 2023 12:54) (18 - 20)  SpO2: 100% (06 Dec 2023 12:54) (99% - 100%)    O2 Parameters below as of 06 Dec 2023 12:54  Patient On (Oxygen Delivery Method): room air            PHYSICAL EXAM:  Gen: NAD  Neuro: A&Ox3  Resp: no increased WOB, satting well on RA  Cardiac: appears well perfused, extremities warm  Abd: soft, nondistended    LABS:     IMAGING STUDIES: SURGERY CONSULT NOTE     HISTORY OF PRESENT ILLNESS:  HPI: 49 y/o M with PMH of HTN, CHF (s/p AICD 2016), CAD (s/p CABG 2015), IDDM, PVD s/p stent x4 in RLE with R big toe/second toe amputation (2021) and ESRD on HD via LUE AVF for 6 years now s/p DDRT 7/16/2023 presenting to the ED with infected R 3rd toe and left 1st metatarsal ulcers. Patient follows with Dr. Malone of Vascular Surgery. Patient states he has had these wounds for a while, follows with Podiatry. However, states that over the past few weeks, they have begun to look worse, specifically the left 1st metatarsal ulcer. Patient denies any motor or sensory changes. Patient denies fever, chills, SOB, chest pain, weakness.     PAST MEDICAL HISTORY: Diabetes mellitus    Foot ulcer due to secondary DM    Coronary artery disease    Acute on chronic systolic heart failure    H/O kidney transplant        PAST SURGICAL HISTORY: Breast Reduction    Toe amputation status, left    S/P CABG (coronary artery bypass graft)    AICD (automatic cardioverter/defibrillator) present    FAMILY HISTORY: Family history of diabetes mellitus (Father)    ALLERGIES: Contrast. (Unknown)      VITAL SIGNS:  T(C): 36.4 (06 Dec 2023 12:54), Max: 36.4 (06 Dec 2023 10:41)  T(F): 97.6 (06 Dec 2023 12:54), Max: 97.6 (06 Dec 2023 12:54)  HR: 77 (06 Dec 2023 12:54) (77 - 80)  BP: 154/55 (06 Dec 2023 12:54) (143/65 - 154/55)  BP(mean): 83 (06 Dec 2023 12:54) (83 - 83)  ABP: --  ABP(mean): --  RR: 18 (06 Dec 2023 12:54) (18 - 20)  SpO2: 100% (06 Dec 2023 12:54) (99% - 100%)    O2 Parameters below as of 06 Dec 2023 12:54  Patient On (Oxygen Delivery Method): room air    PHYSICAL EXAM:  Gen: NAD  Neuro: A&Ox3  Resp: no increased WOB, satting well on RA  Cardiac: appears well perfused, extremities warm  Extremities  -LLE: 1st metatarsal ulcer, +DP/PT signals, warm, motor/ sensory intact   -RLE: 3rd toe wound with dry gangrene, +DP/ PT signals, warm, motor/ sensory intact    SURGERY CONSULT NOTE     HISTORY OF PRESENT ILLNESS:  HPI: 51 y/o M with PMH of HTN, CHF (s/p AICD 2016), CAD (s/p CABG 2015), IDDM, PVD s/p stent x4 in RLE with R big toe/second toe amputation (2021) and ESRD on HD via LUE AVF for 6 years now s/p DDRT 7/16/2023 presenting to the ED with infected R 3rd toe and left 1st metatarsal ulcers. Patient follows with Dr. Malone of Vascular Surgery. Patient states he has had these wounds for a while, follows with Podiatry. However, states that over the past few weeks, they have begun to look worse, specifically the left 1st metatarsal ulcer. Patient denies any motor or sensory changes. Patient denies fever, chills, SOB, chest pain, weakness.     PAST MEDICAL HISTORY: Diabetes mellitus    Foot ulcer due to secondary DM    Coronary artery disease    Acute on chronic systolic heart failure    H/O kidney transplant        PAST SURGICAL HISTORY: Breast Reduction    Toe amputation status, left    S/P CABG (coronary artery bypass graft)    AICD (automatic cardioverter/defibrillator) present    FAMILY HISTORY: Family history of diabetes mellitus (Father)    ALLERGIES: Contrast. (Unknown)      VITAL SIGNS:  T(C): 36.4 (06 Dec 2023 12:54), Max: 36.4 (06 Dec 2023 10:41)  T(F): 97.6 (06 Dec 2023 12:54), Max: 97.6 (06 Dec 2023 12:54)  HR: 77 (06 Dec 2023 12:54) (77 - 80)  BP: 154/55 (06 Dec 2023 12:54) (143/65 - 154/55)  BP(mean): 83 (06 Dec 2023 12:54) (83 - 83)  ABP: --  ABP(mean): --  RR: 18 (06 Dec 2023 12:54) (18 - 20)  SpO2: 100% (06 Dec 2023 12:54) (99% - 100%)    O2 Parameters below as of 06 Dec 2023 12:54  Patient On (Oxygen Delivery Method): room air    PHYSICAL EXAM:  Gen: NAD  Neuro: A&Ox3  Resp: no increased WOB, satting well on RA  Cardiac: appears well perfused, extremities warm  Extremities  -LLE: 1st metatarsal ulcer, +DP/PT signals, warm, motor/ sensory intact   -RLE: 3rd toe wound with dry gangrene, +DP/ PT signals, warm, motor/ sensory intact    Statement Selected SURGERY CONSULT NOTE     HISTORY OF PRESENT ILLNESS:  HPI: 51 y/o M with PMH of HTN, CHF (s/p AICD 2016), CAD (s/p CABG 2015), IDDM, PVD s/p stent x4 in RLE with R big toe/second toe amputation (2021) and ESRD on HD via LUE AVF for 6 years now s/p DDRT 7/16/2023 presenting to the ED with infected R 3rd toe and left 1st metatarsal ulcers. Patient follows with Dr. Malone of Vascular Surgery. Patient states he has had these wounds for a while, follows with Podiatry. However, states that over the past few weeks, they have begun to look worse, specifically the left 1st metatarsal ulcer. Patient denies any motor or sensory changes. Patient denies fever, chills, SOB, chest pain, weakness.   VASCULAR SURG ATT ADDENDUM  Pt is s/p rle  bas  sfa pop a tpt trunk    PAST MEDICAL HISTORY: Diabetes mellitus    Foot ulcer due to secondary DM    Coronary artery disease    Acute on chronic systolic heart failure    H/O kidney transplant        PAST SURGICAL HISTORY: Breast Reduction    Toe amputation status, left    S/P CABG (coronary artery bypass graft)    AICD (automatic cardioverter/defibrillator) present    FAMILY HISTORY: Family history of diabetes mellitus (Father)    ALLERGIES: Contrast. (Unknown)      VITAL SIGNS:  T(C): 36.4 (06 Dec 2023 12:54), Max: 36.4 (06 Dec 2023 10:41)  T(F): 97.6 (06 Dec 2023 12:54), Max: 97.6 (06 Dec 2023 12:54)  HR: 77 (06 Dec 2023 12:54) (77 - 80)  BP: 154/55 (06 Dec 2023 12:54) (143/65 - 154/55)  BP(mean): 83 (06 Dec 2023 12:54) (83 - 83)  ABP: --  ABP(mean): --  RR: 18 (06 Dec 2023 12:54) (18 - 20)  SpO2: 100% (06 Dec 2023 12:54) (99% - 100%)    O2 Parameters below as of 06 Dec 2023 12:54  Patient On (Oxygen Delivery Method): room air    PHYSICAL EXAM:  Gen: NAD  Neuro: A&Ox3  Resp: no increased WOB, satting well on RA  Cardiac: appears well perfused, extremities warm  Extremities  -LLE: 1st metatarsal ulcer, +DP/PT signals, warm, motor/ sensory intact   -RLE: 3rd toe wound with dry gangrene, +DP/ PT signals, warm, motor/ sensory intact

## 2025-02-24 NOTE — CHART NOTE - NSCHARTNOTEFT_GEN_A_CORE
PTA called for category II FHT, however, FHR recovered prior to PTA being held.    30y/o  undergoing IOL at 39 2/7 weeks EGA for IUGR and GDMA1.    SROM at 1:45pm     Last exam ( 23:40) 3/70/-3    IUPC in situ    Vital Signs Last 24 Hrs  T(C): 36.8 (2025 02:31), Max: 36.9 (2025 16:30)  T(F): 98.24 (2025 02:31), Max: 98.42 (2025 16:30)  HR: 83 (2025 03:46) (65 - 120)  BP: 127/59 (2025 03:34) (90/53 - 152/-)  RR: 16 (2025 02:31) (16 - 20)  SpO2: 100% (2025 03:46) (86% - 100%)     with moderate variability and no decelerations.    Oxytocin was restarted after improvement of FHT, contractions currently not adequate.    A/P: continue to titrate oxytocin, reexamine patient at ~ 5:30am or as indicated.    Giovanni Vickers M.D.
PTA held to discuss plan of care    Patient undergoing IOL for FGR (13%, AC 4%, UAD wnl)    Membranes ruptured, on and off Pitocin x15 hours (Pitocin has been paused several times during day shift 2/23 and overnight due to intermittent Cat 2 tracing, please see CPN for additional details)    PLAN:  - FHT is currently Cat 2 with minimal variability and late decelerations; however, just prior to PTA FHT was Cat 1  - IUPC in place, continue amnioinfusion  - Maternal repositioning  - Continue IOL, patient motivated for a vaginal delivery. Will continue to resuscitate tracing, allow for 18hrs of ROM/Pitocin if tracing allows    In attendance for PTA: private attending, Dr. Vickers,  attending, Dr. Cheng, Charge ROVERTO Quinn, primary RN Sharda Mann PGY3
Pt with prolonged FHR deceleration which recovered with position change.  VE: with minimal cervical change.  FHR category II  Plan to proceed with primary  section for failed induction and Category II FHT.    Giovanni Vickers M.D.

## 2025-02-24 NOTE — DISCHARGE NOTE OB - FINANCIAL ASSISTANCE
[facility] provides services at a reduced cost to those who are determined to be eligible through [facility]’s financial assistance program. Information regarding [facility]’s financial assistance program can be found by going to [email or web address] or by calling [phone].

## 2025-02-24 NOTE — OB PROVIDER LABOR PROGRESS NOTE - ASSESSMENT
29y  @ 39wks/2d here for IOL for IUGR    - s/p buccal cytotec and cervical balloon   - VE   - SROM @ 125p  - Patient repositioned for FHR resuscitation  - Continue Pit 2c2  - Cont fetal/maternal monitoring  - Will reassess PRN    Discussed with Dr. Rancho Cardoza, PGY-1

## 2025-02-24 NOTE — DISCHARGE NOTE OB - MEDICATION SUMMARY - MEDICATIONS TO TAKE
I will START or STAY ON the medications listed below when I get home from the hospital:    ibuprofen 600 mg oral tablet  -- 1 tab(s) by mouth every 6 hours  -- Indication: For pain    acetaminophen 325 mg oral tablet  -- 3 tab(s) by mouth every 6 hours  -- Indication: For pain    Prenatal Multivitamins with Folic Acid 1 mg oral tablet  -- 1 tab(s) by mouth once a day  -- Indication: For postpartum     ferrous sulfate 325 mg (65 mg elemental iron) oral tablet  -- 1 tab(s) by mouth once a day  -- Indication: For anemia     levothyroxine 50 mcg (0.05 mg) oral tablet  -- 1 tab(s) by mouth once a day  -- Indication: For Hypothyroidism

## 2025-02-24 NOTE — DISCHARGE NOTE OB - ADDITIONAL INSTRUCTIONS
Follow up @ [facility] office in 7days for PP BP Check + Incision check   Monitor BP @ home 3 times daily (morning/noon/night)  keep a strict blood pressure log and bring to the office 7 days after hospital discharge for review  if you have BP > 160/100 call the office for further advise  if you have headaches, vision changes, upper abdominal pain call the office to notify and go to [facility] Triage for evaluation

## 2025-02-24 NOTE — OB PROVIDER DELIVERY SUMMARY - NSPROVIDERDELIVERYNOTE_OBGYN_ALL_OB_FT
Primary low transverse  section via a modified Jersey-Quesada technique under epidural anesthesia for failed induction and Category II FHT. Male infant delivered from NABIL. Cord clamped and cut, infant passed to Pediatrics in attendance. Segment of cord isolated for cord gases. Placenta expressed from the endometrium intact. Hysterotomy closed with a continuous modified mattress stitch using 1-Maxon suture. Sub-q approximated with 2-0 plain catgut. Skin closed with a subcuticular stitch. EBL 389cc, IVF 1000cc, U/O 100cc. Infant to NICU and mother transferred to PACU in stable condition. Primary low transverse  section via a modified Jersey-Quesada technique under epidural anesthesia for failed induction and Category II FHT. Male infant delivered from NABIL. 3370g APGARS 1/5. Cord clamped and cut, infant passed to Pediatrics in attendance. Segment of cord isolated for cord gases. Placenta expressed from the endometrium intact. Hysterotomy closed with a continuous modified mattress stitch using 1-Maxon suture. Rectus muscles reapproximated with 0 vicryl in an interrupted fashion. Fascia closed with 0 Vicryl in running fashion. Sub-q approximated with 2-0 plain catgut. Skin closed with a subcuticular stitch. EBL 389cc, IVF 1000cc, U/O 100cc. Infant to NICU and mother transferred to PACU in stable condition.    Dictation #[id number]

## 2025-02-25 LAB
ALBUMIN SERPL ELPH-MCNC: 2.7 G/DL — LOW (ref 3.3–5)
ALP SERPL-CCNC: 130 U/L — HIGH (ref 40–120)
ALT FLD-CCNC: 27 U/L — SIGNIFICANT CHANGE UP (ref 4–33)
ANION GAP SERPL CALC-SCNC: 10 MMOL/L — SIGNIFICANT CHANGE UP (ref 7–14)
AST SERPL-CCNC: 31 U/L — SIGNIFICANT CHANGE UP (ref 4–32)
BASOPHILS # BLD AUTO: 0.03 K/UL — SIGNIFICANT CHANGE UP (ref 0–0.2)
BASOPHILS # BLD AUTO: 0.03 K/UL — SIGNIFICANT CHANGE UP (ref 0–0.2)
BASOPHILS NFR BLD AUTO: 0.2 % — SIGNIFICANT CHANGE UP (ref 0–2)
BASOPHILS NFR BLD AUTO: 0.2 % — SIGNIFICANT CHANGE UP (ref 0–2)
BILIRUB SERPL-MCNC: <0.2 MG/DL — SIGNIFICANT CHANGE UP (ref 0.2–1.2)
BUN SERPL-MCNC: 10 MG/DL — SIGNIFICANT CHANGE UP (ref 7–23)
CALCIUM SERPL-MCNC: 8.5 MG/DL — SIGNIFICANT CHANGE UP (ref 8.4–10.5)
CHLORIDE SERPL-SCNC: 110 MMOL/L — HIGH (ref 98–107)
CO2 SERPL-SCNC: 18 MMOL/L — LOW (ref 22–31)
CREAT SERPL-MCNC: 0.68 MG/DL — SIGNIFICANT CHANGE UP (ref 0.5–1.3)
EGFR: 119 ML/MIN/1.73M2 — SIGNIFICANT CHANGE UP
EOSINOPHIL # BLD AUTO: 0.09 K/UL — SIGNIFICANT CHANGE UP (ref 0–0.5)
EOSINOPHIL # BLD AUTO: 0.11 K/UL — SIGNIFICANT CHANGE UP (ref 0–0.5)
EOSINOPHIL NFR BLD AUTO: 0.7 % — SIGNIFICANT CHANGE UP (ref 0–6)
EOSINOPHIL NFR BLD AUTO: 0.8 % — SIGNIFICANT CHANGE UP (ref 0–6)
GLUCOSE SERPL-MCNC: 97 MG/DL — SIGNIFICANT CHANGE UP (ref 70–99)
HCT VFR BLD CALC: 26.3 % — LOW (ref 34.5–45)
HCT VFR BLD CALC: 28.4 % — LOW (ref 34.5–45)
HGB BLD-MCNC: 8.7 G/DL — LOW (ref 11.5–15.5)
HGB BLD-MCNC: 9.1 G/DL — LOW (ref 11.5–15.5)
IANC: 11.74 K/UL — HIGH (ref 1.8–7.4)
IANC: 11.9 K/UL — HIGH (ref 1.8–7.4)
IMM GRANULOCYTES NFR BLD AUTO: 0.6 % — SIGNIFICANT CHANGE UP (ref 0–0.9)
IMM GRANULOCYTES NFR BLD AUTO: 0.7 % — SIGNIFICANT CHANGE UP (ref 0–0.9)
LDH SERPL L TO P-CCNC: 261 U/L — HIGH (ref 135–225)
LYMPHOCYTES # BLD AUTO: 0.99 K/UL — LOW (ref 1–3.3)
LYMPHOCYTES # BLD AUTO: 1.16 K/UL — SIGNIFICANT CHANGE UP (ref 1–3.3)
LYMPHOCYTES # BLD AUTO: 7.3 % — LOW (ref 13–44)
LYMPHOCYTES # BLD AUTO: 8.6 % — LOW (ref 13–44)
MCHC RBC-ENTMCNC: 29.3 PG — SIGNIFICANT CHANGE UP (ref 27–34)
MCHC RBC-ENTMCNC: 29.3 PG — SIGNIFICANT CHANGE UP (ref 27–34)
MCHC RBC-ENTMCNC: 32 G/DL — SIGNIFICANT CHANGE UP (ref 32–36)
MCHC RBC-ENTMCNC: 32 G/DL — SIGNIFICANT CHANGE UP (ref 32–36)
MCV RBC AUTO: 91.3 FL — SIGNIFICANT CHANGE UP (ref 80–100)
MCV RBC AUTO: 91.5 FL — SIGNIFICANT CHANGE UP (ref 80–100)
MONOCYTES # BLD AUTO: 0.4 K/UL — SIGNIFICANT CHANGE UP (ref 0–0.9)
MONOCYTES # BLD AUTO: 0.43 K/UL — SIGNIFICANT CHANGE UP (ref 0–0.9)
MONOCYTES NFR BLD AUTO: 3 % — SIGNIFICANT CHANGE UP (ref 2–14)
MONOCYTES NFR BLD AUTO: 3.2 % — SIGNIFICANT CHANGE UP (ref 2–14)
NEUTROPHILS # BLD AUTO: 11.74 K/UL — HIGH (ref 1.8–7.4)
NEUTROPHILS # BLD AUTO: 11.9 K/UL — HIGH (ref 1.8–7.4)
NEUTROPHILS NFR BLD AUTO: 86.8 % — HIGH (ref 43–77)
NEUTROPHILS NFR BLD AUTO: 87.9 % — HIGH (ref 43–77)
NRBC # BLD AUTO: 0 K/UL — SIGNIFICANT CHANGE UP (ref 0–0)
NRBC # BLD AUTO: 0 K/UL — SIGNIFICANT CHANGE UP (ref 0–0)
NRBC # FLD: 0 K/UL — SIGNIFICANT CHANGE UP (ref 0–0)
NRBC # FLD: 0 K/UL — SIGNIFICANT CHANGE UP (ref 0–0)
NRBC BLD AUTO-RTO: 0 /100 WBCS — SIGNIFICANT CHANGE UP (ref 0–0)
NRBC BLD AUTO-RTO: 0 /100 WBCS — SIGNIFICANT CHANGE UP (ref 0–0)
PLATELET # BLD AUTO: 167 K/UL — SIGNIFICANT CHANGE UP (ref 150–400)
PLATELET # BLD AUTO: 168 K/UL — SIGNIFICANT CHANGE UP (ref 150–400)
POTASSIUM SERPL-MCNC: 3.6 MMOL/L — SIGNIFICANT CHANGE UP (ref 3.5–5.3)
POTASSIUM SERPL-SCNC: 3.6 MMOL/L — SIGNIFICANT CHANGE UP (ref 3.5–5.3)
PROT SERPL-MCNC: 5.6 G/DL — LOW (ref 6–8.3)
RBC # BLD: 2.94 M/UL — LOW (ref 3.8–5.2)
RBC # BLD: 3.11 M/UL — LOW (ref 3.8–5.2)
RBC # FLD: 14.3 % — SIGNIFICANT CHANGE UP (ref 10.3–14.5)
RBC # FLD: 14.4 % — SIGNIFICANT CHANGE UP (ref 10.3–14.5)
SODIUM SERPL-SCNC: 138 MMOL/L — SIGNIFICANT CHANGE UP (ref 135–145)
URATE SERPL-MCNC: 5.2 MG/DL — SIGNIFICANT CHANGE UP (ref 2.5–7)
WBC # BLD: 13.52 K/UL — HIGH (ref 3.8–10.5)
WBC # BLD: 13.53 K/UL — HIGH (ref 3.8–10.5)
WBC # FLD AUTO: 13.52 K/UL — HIGH (ref 3.8–10.5)
WBC # FLD AUTO: 13.53 K/UL — HIGH (ref 3.8–10.5)

## 2025-02-25 RX ORDER — IBUPROFEN 200 MG
600 TABLET ORAL EVERY 6 HOURS
Refills: 0 | Status: DISCONTINUED | OUTPATIENT
Start: 2025-02-25 | End: 2025-02-27

## 2025-02-25 RX ADMIN — KETOROLAC TROMETHAMINE 30 MILLIGRAM(S): 30 INJECTION, SOLUTION INTRAMUSCULAR; INTRAVENOUS at 01:30

## 2025-02-25 RX ADMIN — KETOROLAC TROMETHAMINE 30 MILLIGRAM(S): 30 INJECTION, SOLUTION INTRAMUSCULAR; INTRAVENOUS at 06:40

## 2025-02-25 RX ADMIN — Medication 50 MICROGRAM(S): at 06:09

## 2025-02-25 RX ADMIN — KETOROLAC TROMETHAMINE 30 MILLIGRAM(S): 30 INJECTION, SOLUTION INTRAMUSCULAR; INTRAVENOUS at 00:57

## 2025-02-25 RX ADMIN — Medication 975 MILLIGRAM(S): at 04:36

## 2025-02-25 RX ADMIN — Medication 600 MILLIGRAM(S): at 17:17

## 2025-02-25 RX ADMIN — Medication 975 MILLIGRAM(S): at 21:00

## 2025-02-25 RX ADMIN — Medication 975 MILLIGRAM(S): at 10:17

## 2025-02-25 RX ADMIN — Medication 975 MILLIGRAM(S): at 15:58

## 2025-02-25 RX ADMIN — Medication 325 MILLIGRAM(S): at 12:30

## 2025-02-25 RX ADMIN — Medication 2 TABLET(S): at 22:34

## 2025-02-25 RX ADMIN — HEPARIN SODIUM 5000 UNIT(S): 1000 INJECTION INTRAVENOUS; SUBCUTANEOUS at 06:10

## 2025-02-25 RX ADMIN — Medication 975 MILLIGRAM(S): at 05:10

## 2025-02-25 RX ADMIN — Medication 1 TABLET(S): at 12:30

## 2025-02-25 RX ADMIN — Medication 600 MILLIGRAM(S): at 12:30

## 2025-02-25 RX ADMIN — Medication 975 MILLIGRAM(S): at 11:00

## 2025-02-25 RX ADMIN — Medication 975 MILLIGRAM(S): at 16:50

## 2025-02-25 RX ADMIN — KETOROLAC TROMETHAMINE 30 MILLIGRAM(S): 30 INJECTION, SOLUTION INTRAMUSCULAR; INTRAVENOUS at 06:10

## 2025-02-25 RX ADMIN — Medication 600 MILLIGRAM(S): at 13:30

## 2025-02-25 RX ADMIN — HEPARIN SODIUM 5000 UNIT(S): 1000 INJECTION INTRAVENOUS; SUBCUTANEOUS at 17:17

## 2025-02-25 RX ADMIN — Medication 975 MILLIGRAM(S): at 20:20

## 2025-02-25 RX ADMIN — Medication 600 MILLIGRAM(S): at 18:00

## 2025-02-25 NOTE — PROGRESS NOTE ADULT - SUBJECTIVE AND OBJECTIVE BOX
Post-Operative Note, C/S  She is a  29y woman who is now post-operative day: 1    Subjective:  The patient feels well.  She is ambulating.   She is tolerating regular diet.  She denies nausea and vomiting; denies fever.  She is voiding.  Her pain is controlled; incisional pain is appropriate.  She reports normal postpartum bleeding.  She denies HA, blurry vision, vision changes.   Denies abd pain.     Physical exam:    Vital Signs Last 24 Hrs  T(C): 36.6 (25 Feb 2025 06:15), Max: 36.9 (24 Feb 2025 13:54)  T(F): 97.8 (25 Feb 2025 06:15), Max: 98.4 (24 Feb 2025 13:54)  HR: 90 (25 Feb 2025 06:15) (90 - 105)  BP: 116/55 (25 Feb 2025 06:15) (108/55 - 135/81)  BP(mean): --  RR: 18 (25 Feb 2025 06:15) (18 - 18)  SpO2: 99% (25 Feb 2025 06:15) (99% - 100%)    Parameters below as of 25 Feb 2025 06:15  Patient On (Oxygen Delivery Method): room air        Gen: NAD  Breast: Soft, nontender, not engorged.  Abdomen: Soft, nontender, no distension , firm uterine fundus at umbilicus.  Incision: C/D/I.   Pelvic: Normal lochia noted  Ext: No calf tenderness    LABS:                        9.1    13.53 )-----------( 167      ( 25 Feb 2025 10:33 )             28.4       Rubella status:     Allergies    No Known Allergies    Intolerances      MEDICATIONS  (STANDING):  acetaminophen     Tablet .. 975 milliGRAM(s) Oral <User Schedule>  diphtheria/tetanus/pertussis (acellular) Vaccine (Adacel) 0.5 milliLiter(s) IntraMuscular once  ferrous    sulfate 325 milliGRAM(s) Oral daily  heparin   Injectable 5000 Unit(s) SubCutaneous every 12 hours  ibuprofen  Tablet. 600 milliGRAM(s) Oral every 6 hours  lactated ringers. 1000 milliLiter(s) (83 mL/Hr) IV Continuous <Continuous>  levothyroxine 50 MICROGram(s) Oral daily  misoprostol Oral Solution 40 MICROGram(s) Oral every 2 hours  prenatal multivitamin 1 Tablet(s) Oral daily  senna 2 Tablet(s) Oral at bedtime    MEDICATIONS  (PRN):  diphenhydrAMINE 25 milliGRAM(s) Oral every 6 hours PRN Pruritus  lanolin Ointment 1 Application(s) Topical every 6 hours PRN Sore Nipples  magnesium hydroxide Suspension 30 milliLiter(s) Oral two times a day PRN Constipation  oxyCODONE    IR 5 milliGRAM(s) Oral every 3 hours PRN Moderate to Severe Pain (4-10)  oxyCODONE    IR 5 milliGRAM(s) Oral once PRN Moderate to Severe Pain (4-10)  simethicone 80 milliGRAM(s) Chew every 4 hours PRN Gas        Assessment and Plan  POD #1 s/p C/S c/b gHTN.    PP  ·	Doing well.  ·	Encourage ambulation.  ·	Encourage breastfeeding.   ·	PP education materials provided.   ·	Incisional care and PO instructions reviewed.  ·	CPC.    gHTN  ·	HELLP labs ordered  ·	gHTN counselling  ·	PEC precautions reviewed with pt  ·	BP logs given  ·	BP cuff to be sent to home pharmacy through [employer] EMR  ·	CPC    Plan for D/C home 2/27-2/28  Plan reviewed with Dr. Cornoa

## 2025-02-25 NOTE — BH CONSULTATION LIAISON ASSESSMENT NOTE - HPI (INCLUDE ILLNESS QUALITY, SEVERITY, DURATION, TIMING, CONTEXT, MODIFYING FACTORS, ASSOCIATED SIGNS AND SYMPTOMS)
Patient is a 29 year old female  presented at 39.0 weeks for delivery. Patient now s/p  on . Patient comes from home ( has 2 primary residences as per patient - 1 with her spouse, mother and mothers extended family and the other with spouse and his mother). Patient with PPHX of "adjutsment disorder with anxiety" with no inpatient admissions, no SA, no legal issues or substance abuse. Patient has an outpatient therapist at [facility]. States at this [facility] they do annual psychiatric evals as well.     Psychiatry was called for results of questionnaire - "Thinking that you would be better off dead or that you want to hurt yourself in  some way  "  Answers: Several days    Patient was seen and assessed at bedside. Patient is alert, oriented, calm, cooperative, attentive and bonding with baby.  Patient states pregnancy was not easy as she was at risk for pre-eclampsia. Delivery was difficult as well and baby required some time in NICU. overall expressed mood as anxious during pregnancy, now relieved as baby is healthy. patient has home set up and ready for baby. She is formula feeding.  Patient states she has a hx of anxiety - feels therapy is very helpful and has never required medications. Patient talks about the importance of seeing her providers and follow up.  We discussed the questionnaire and her responses. As per patient, she states, not currently, but in the past she has thought about not being alive. patient explains "sometimes I wondered if it was easier dead but never thought to kill myself". Patient states she has never had any active SI or HI. patient reports wanting to live, wanting to be with family, wanting to spend time with her new baby, wanting to return to work. Patient further discusses that these thoughts arise when she is having difficult times with her mother or spouse. She feels there is a generational trauma or gap that makes her misunderstood.  Patient states when she feels this way she has been coping in a healthy manner - patient likes to write down things she is thankful for about the person she is disagreeing with and focuses on their positive attributes. Patient feels safe and excited to go home. Has no hallucinations or jose. Able to discuss post partum risk factors and is highly educated.   Spoke with patient mother and spouse who have no acute psychiatric safety concerns. Mother does feel patient can become overwhelmed or anxious easily, and encourages patient to "be strong and do it yourself". Mother and spouse as well as other family members will be able to help patient when home. MOther understands importance of patient self care and sleep cycle.

## 2025-02-25 NOTE — BH CONSULTATION LIAISON ASSESSMENT NOTE - NSBHCHARTREVIEWLAB_PSY_A_CORE FT
9.1    13.53 )-----------( 167      ( 25 Feb 2025 10:33 )             28.4   02-25    138  |  110[H]  |  10  ----------------------------<  97  3.6   |  18[L]  |  0.68    Ca    8.5      25 Feb 2025 10:29    TPro  5.6[L]  /  Alb  2.7[L]  /  TBili  <0.2  /  DBili  x   /  AST  31  /  ALT  27  /  AlkPhos  130[H]  02-25

## 2025-02-25 NOTE — BH CONSULTATION LIAISON ASSESSMENT NOTE - NSBHATTESTAPPAMEND_PSY_A_CORE
I have personally seen and examined this patient. I fully participated in the care of this patient. I have made amendments to the documentation where appropriate and otherwise agree with the history, physical exam, and plan as documented by the [facility]

## 2025-02-25 NOTE — BH CONSULTATION LIAISON ASSESSMENT NOTE - RISK ASSESSMENT
risk: recent delivery, anxiety  protective:  no SI or HI, no hx of SA, lives with family, reason for living, [employer], no substance abuse

## 2025-02-25 NOTE — BH CONSULTATION LIAISON ASSESSMENT NOTE - NSSUICPROTFACT_PSY_ALL_CORE
Responsibility to children, family, or others/Identifies reasons for living/Supportive social network of family or friends/Fear of death or the actual act of killing self/Cultural, spiritual and/or moral attitudes against suicide/Engaged in work or school/Positive therapeutic relationships/Zoroastrian beliefs

## 2025-02-25 NOTE — BH CONSULTATION LIAISON ASSESSMENT NOTE - NSBHCONSULTFOLLOWAFTERCARE_PSY_A_CORE FT
[facility]  [phone]    [facility]  [phone] [place] [address], [address] Floor, [place] [address]  [phone]  [email or web address]: [phone]

## 2025-02-25 NOTE — BH CONSULTATION LIAISON ASSESSMENT NOTE - NSBHCHARTREVIEWVS_PSY_A_CORE FT
Vital Signs Last 24 Hrs  T(C): 36.7 (25 Feb 2025 13:52), Max: 36.8 (24 Feb 2025 17:37)  T(F): 98.1 (25 Feb 2025 13:52), Max: 98.2 (24 Feb 2025 17:37)  HR: 87 (25 Feb 2025 13:52) (87 - 105)  BP: 119/71 (25 Feb 2025 13:52) (108/55 - 132/78)  BP(mean): --  RR: 18 (25 Feb 2025 13:52) (18 - 18)  SpO2: 100% (25 Feb 2025 13:52) (99% - 100%)    Parameters below as of 25 Feb 2025 13:52  Patient On (Oxygen Delivery Method): room air

## 2025-02-25 NOTE — BH CONSULTATION LIAISON ASSESSMENT NOTE - NSBHATTESTCOMMENTATTENDFT_PSY_A_CORE
Patient seen on 2/26 - Patient 29F, minimal formal PPH, now s/p CS of baby. Patient calm, cooperative, has some stress from home, expectations of motherhood. Denies suicidality/intent or plan and denies homicidality/intent or plan. Denies AH/VH, delusions or paranoia. Denies decreased need for sleep, increased energy, racing thoughts, irritability euphoria or grandiosity. Feels safe to go home, seen with baby named Ariz, smilling and engaged with child, thinking about future of child and her own, family and Yazdanism protective factors. No psych c/i to discharge. Amenable to f/u with psych as outpt. No safety concerns from family when seen on 2/25 or 2/26.

## 2025-02-25 NOTE — BH CONSULTATION LIAISON ASSESSMENT NOTE - SUMMARY
MARKP NOTE     Problem:  Anxiety     Behavior:  Patient is in patient’s room laying in bed sleeping. Upon being woken up by this nurse, patient is pleasant and calm. Patient’s affect is broad range. Patient is talkative. Patient maintains good eye contact.    Group Participation: N/A  Appetite/Meals: N/A       Interventions:  This nurse completed a shift assessment. This nurse attempted to administer patient's scheduled night time medications. Other unit staff nurse administered patient's scheduled night time medications.    Response:  Patient remained pleasant and calm throughout this interaction. Patient allowed this nurse to conduct and complete a physical assessment. Patient refused to take patient’s tonight’s scheduled night time medications because patient believed that patient had already taken patient’s tonight’s scheduled night time medications that patient had to follow what patient’s doctor said and did not want to take any extra doses. Attempts to explain to patient that patient had not yet gotten patient’s tonight’s scheduled night time medications and that what I was giving to patient were patient’s tonight’s scheduled night time medications were unsuccessful because patient remained firm in patient’s belief and refused to believe anything that this nurse was telling patient and finally patient just got up and walked away from this nurse. Additionally, patient believed that this nurse was someone's son and after getting up and walking away from this nurse, patient was walking up to other patients asking if this nurse was their son. Later, other unit staff nurse was able to administer patient’s tonight’s scheduled night time medications.     Plan:  Continue to monitor for anxiety. Continue to monitor for patient safety.    Patient is a 29 year old female  presented at 39.0 weeks for delivery. Patient now s/p  on . Patient comes from home ( has 2 primary residences as per patient - 1 with her spouse, mother and mothers extended family and the other with spouse and his mother). Patient with PPHX of "adjutsment disorder with anxiety" with no inpatient admissions, no SA, no legal issues or substance abuse. Patient has an outpatient therapist at [facility]. States at this facilitiy they do annual psychiatric evals as well.     Psychiatry was called for results of questionnaire - "Thinking that you would be better off dead or that you want to hurt yourself in  some way  "Answers: Several days    PLAN  - patient with no SI or HI, able to discuss questionnaire response in full detail with no acute safety concerns from patient or family.   - no psychotropic medications at this time  - CL to follow on   - patient to f/u outpatient with providers from [place]   - [facility] [phone] (M-F 9am-7pm)   [facility] [phone]    Patient is a 29 year old female  presented at 39.0 weeks for delivery. Patient now s/p  on . Patient comes from home ( has 2 primary residences as per patient - 1 with her spouse, mother and mothers extended family and the other with spouse and his mother). Patient with PPHX of "adjutsment disorder with anxiety" with no inpatient admissions, no SA, no legal issues or substance abuse. Patient has an outpatient therapist at [facility]. [facility] as well.     Psychiatry was called for results of questionnaire - "Thinking that you would be better off dead or that you want to hurt yourself in  some way  "Answers: Several days    Patient without signs of MDD, CHRISTOPHER, bipolar d/o, or psychosis. Denies suicidality/intent or plan and denies homicidality/intent or plan. Future oriented.     PLAN  - patient with no SI or HI, able to discuss questionnaire response in full detail with no acute safety concerns from patient or family.   - no psychotropic medications at this time  - patient to f/u outpatient with providers from [place]   - [facility] [phone] (M-F 9am-7pm)   [facility] [phone]

## 2025-02-26 PROCEDURE — 90792 PSYCH DIAG EVAL W/MED SRVCS: CPT

## 2025-02-26 RX ADMIN — Medication 1 TABLET(S): at 17:29

## 2025-02-26 RX ADMIN — HEPARIN SODIUM 5000 UNIT(S): 1000 INJECTION INTRAVENOUS; SUBCUTANEOUS at 17:30

## 2025-02-26 RX ADMIN — Medication 600 MILLIGRAM(S): at 18:29

## 2025-02-26 RX ADMIN — Medication 600 MILLIGRAM(S): at 23:46

## 2025-02-26 RX ADMIN — Medication 975 MILLIGRAM(S): at 09:40

## 2025-02-26 RX ADMIN — Medication 975 MILLIGRAM(S): at 16:41

## 2025-02-26 RX ADMIN — Medication 325 MILLIGRAM(S): at 17:29

## 2025-02-26 RX ADMIN — Medication 975 MILLIGRAM(S): at 15:41

## 2025-02-26 RX ADMIN — Medication 50 MICROGRAM(S): at 05:31

## 2025-02-26 RX ADMIN — Medication 975 MILLIGRAM(S): at 08:40

## 2025-02-26 RX ADMIN — Medication 600 MILLIGRAM(S): at 17:29

## 2025-02-26 RX ADMIN — Medication 600 MILLIGRAM(S): at 00:36

## 2025-02-26 RX ADMIN — Medication 600 MILLIGRAM(S): at 12:39

## 2025-02-26 RX ADMIN — Medication 600 MILLIGRAM(S): at 01:10

## 2025-02-26 RX ADMIN — Medication 600 MILLIGRAM(S): at 11:39

## 2025-02-26 RX ADMIN — Medication 600 MILLIGRAM(S): at 05:30

## 2025-02-26 RX ADMIN — Medication 975 MILLIGRAM(S): at 20:33

## 2025-02-26 RX ADMIN — Medication 600 MILLIGRAM(S): at 06:00

## 2025-02-26 RX ADMIN — HEPARIN SODIUM 5000 UNIT(S): 1000 INJECTION INTRAVENOUS; SUBCUTANEOUS at 05:31

## 2025-02-26 RX ADMIN — Medication 975 MILLIGRAM(S): at 21:03

## 2025-02-26 NOTE — LACTATION INITIAL EVALUATION - LACTATION INTERVENTIONS
Primary RN made aware of consult and plan./initiate/review safe skin-to-skin/initiate/review hand expression/initiate/review pumping guidelines and safe milk handling/initiate/review techniques for position and latch/post discharge community resources provided/initiate/review supplementation plan due to medical indications/review techniques to increase milk supply/review techniques to manage sore nipples/engorgement/initiate/review breast massage/compression/reviewed components of an effective feeding and at least 8 effective feedings per day required/reviewed importance of monitoring infant diapers, the breastfeeding log, and minimum output each day/reviewed risks of artificial nipples/reviewed strategies to transition to breastfeeding only/reviewed benefits and recommendations for rooming in/reviewed feeding on demand/by cue at least 8 times a day

## 2025-02-26 NOTE — LACTATION INITIAL EVALUATION - POTENTIAL FOR
ineffective breastfeeding/sore nipples/engorgement/knowledge deficit/feeding confusion/latch on difficulty/low supply/delayed secretory activation

## 2025-02-26 NOTE — PROGRESS NOTE ADULT - ASSESSMENT
Patient seen at bedside resting comfortably offers no new complaints. + Ambulation, + void without difficulty, + flatus;  no bm; tolerating regular diet. both breastfeeding and bottle feeding. Denies HA, blurry vision or epigastric pain, CP, SOB, N/V/D,  dizziness, palpitations, worsening vaginal bleeding.    Vital Signs Last 24 Hrs  T(C): 36.8 (26 Feb 2025 05:36), Max: 37.1 (25 Feb 2025 17:07)  T(F): 98.2 (26 Feb 2025 05:36), Max: 98.8 (25 Feb 2025 17:07)  HR: 90 (26 Feb 2025 05:36) (77 - 90)  BP: 122/63 (26 Feb 2025 05:36) (119/71 - 128/72)  BP(mean): --  RR: 18 (26 Feb 2025 05:36) (17 - 18)  SpO2: 99% (26 Feb 2025 05:36) (99% - 100%)    Parameters below as of 26 Feb 2025 05:36  Patient On (Oxygen Delivery Method): room air        Gen: A&O x 3, NAD  Chest: CTABL  Cardiac: S1, S2, RRR  Breast: Soft, nontender, nonengorged  Abdomen: +BS; soft; Nontender, nondistended, Incision C/D/I steri strips in place   Gyn: Minimal lochia  Extremities: Nontender, DTRS 2+, no worsening edema                          9.1    13.53 )-----------( 167      ( 25 Feb 2025 10:33 )             28.4       A/P: 29 yr old F POD #2 s/p p/c/s for CAT II on 2/24/2025 c/b GHTN     #GHTN   - BP x 24 hours: BP:  (119/71 - 128/72)  - Recent /63  - HELLP Labs- WNL   - PEC labs discussed   - BP cuff to be sent the day of discharge to home pharmacy     #Anemia   -   - Starting H/H 12.2/36.8 --> 9.1/28.4  - Fe supplements  - Pt is asymptomatic     #PP   - Pain management as needed  - Cont post op care  - OOB and ambulate  - Incision C/D/I- dermabond   - Encourage breastfeeding   - Pending psych for high PHQ   - Discharge planning for 2/27 or 2/28    -d/w dr Connie Singh NP

## 2025-02-27 VITALS
DIASTOLIC BLOOD PRESSURE: 78 MMHG | RESPIRATION RATE: 18 BRPM | OXYGEN SATURATION: 99 % | SYSTOLIC BLOOD PRESSURE: 138 MMHG | TEMPERATURE: 98 F | HEART RATE: 95 BPM

## 2025-02-27 RX ORDER — PRENATAL 136/IRON/FOLIC ACID 27 MG-1 MG
1 TABLET ORAL
Qty: 0 | Refills: 0 | DISCHARGE
Start: 2025-02-27

## 2025-02-27 RX ORDER — IBUPROFEN 200 MG
1 TABLET ORAL
Qty: 0 | Refills: 0 | DISCHARGE
Start: 2025-02-27

## 2025-02-27 RX ORDER — ACETAMINOPHEN 500 MG/5ML
3 LIQUID (ML) ORAL
Qty: 0 | Refills: 0 | DISCHARGE
Start: 2025-02-27

## 2025-02-27 RX ORDER — OXYCODONE HYDROCHLORIDE 30 MG/1
5 TABLET ORAL
Refills: 0 | Status: DISCONTINUED | OUTPATIENT
Start: 2025-02-27 | End: 2025-02-27

## 2025-02-27 RX ORDER — LEVOTHYROXINE SODIUM 300 MCG
1 TABLET ORAL
Qty: 0 | Refills: 0 | DISCHARGE
Start: 2025-02-27

## 2025-02-27 RX ORDER — FERROUS SULFATE 137(45) MG
1 TABLET, EXTENDED RELEASE ORAL
Qty: 0 | Refills: 0 | DISCHARGE
Start: 2025-02-27

## 2025-02-27 RX ADMIN — Medication 975 MILLIGRAM(S): at 02:09

## 2025-02-27 RX ADMIN — Medication 975 MILLIGRAM(S): at 15:20

## 2025-02-27 RX ADMIN — Medication 600 MILLIGRAM(S): at 05:54

## 2025-02-27 RX ADMIN — HEPARIN SODIUM 5000 UNIT(S): 1000 INJECTION INTRAVENOUS; SUBCUTANEOUS at 17:36

## 2025-02-27 RX ADMIN — Medication 975 MILLIGRAM(S): at 08:40

## 2025-02-27 RX ADMIN — Medication 600 MILLIGRAM(S): at 05:24

## 2025-02-27 RX ADMIN — Medication 600 MILLIGRAM(S): at 11:10

## 2025-02-27 RX ADMIN — OXYCODONE HYDROCHLORIDE 5 MILLIGRAM(S): 30 TABLET ORAL at 02:46

## 2025-02-27 RX ADMIN — Medication 975 MILLIGRAM(S): at 14:49

## 2025-02-27 RX ADMIN — Medication 50 MICROGRAM(S): at 05:24

## 2025-02-27 RX ADMIN — Medication 975 MILLIGRAM(S): at 02:39

## 2025-02-27 RX ADMIN — Medication 1 TABLET(S): at 11:11

## 2025-02-27 RX ADMIN — OXYCODONE HYDROCHLORIDE 5 MILLIGRAM(S): 30 TABLET ORAL at 03:16

## 2025-02-27 RX ADMIN — Medication 600 MILLIGRAM(S): at 00:16

## 2025-02-27 RX ADMIN — Medication 975 MILLIGRAM(S): at 09:10

## 2025-02-27 RX ADMIN — HEPARIN SODIUM 5000 UNIT(S): 1000 INJECTION INTRAVENOUS; SUBCUTANEOUS at 05:23

## 2025-02-27 RX ADMIN — Medication 600 MILLIGRAM(S): at 11:40

## 2025-02-27 RX ADMIN — Medication 325 MILLIGRAM(S): at 11:12

## 2025-02-27 NOTE — PROGRESS NOTE ADULT - ASSESSMENT
A/P: 29 yr old F POD #2 s/p p/c/s for CAT II on 2/24/2025 c/b GHTN     #gHTN  -BP x 24 hours: BP:  (126/65 - 145/85)  -HELLP LABS WDL   -Declined sxs of PEC   -PEC precautions   -BP cuff sent via [facility]     #Acute blood loss Anemia   -H/H12.2/36.8-->8.7/26.3-->9.1/28.4  -Iron +Vit C  -Asymptomatic     #Postpartum   Her pain is well controlled.   She is tolerating a regular diet and passing flatus.   Denies N/V. Denies CP/SOB/lightheadedness/dizziness.   She is ambulating without difficulty.   Voiding spontaneously.    Patient is stable and doing well post-operatively.    - Continue regular diet.  - Increase ambulation.  - Continue motrin, tylenol PRN for pain control.   -Encourage breastfeeding.  -Incisional care and PO instructions reviewed.     Follow up @ [facility] office in 7days for PP BP Check  Monitor BP @ home 3 times daily (morning/noon/night)  keep a strict blood pressure log and bring to the office 7 days after hospital discharge for review  if you have BP > 160/100 call the office for further advise  if you have headaches, vision changes, upper abdominal pain call the office to notify and go to [facility] Triage for evaluation  [phone]    Discussed with MD Rancho BURKETT      A/P: 29 yr old F POD #3 s/p p/c/s for CAT II on 2/24/2025 c/b GHTN     #gHTN  -BP x 24 hours: BP:  (126/65 - 145/85)  -HELLP LABS WDL   -Declined sxs of PEC   -PEC precautions   -BP cuff sent via [facility]     #Acute blood loss Anemia   -H/H12.2/36.8-->8.7/26.3-->9.1/28.4  -Iron +Vit C  -Asymptomatic     #Postpartum   Her pain is well controlled.   She is tolerating a regular diet and passing flatus.   Denies N/V. Denies CP/SOB/lightheadedness/dizziness.   She is ambulating without difficulty.   Voiding spontaneously.    Patient is stable and doing well post-operatively.    - Continue regular diet.  - Increase ambulation.  - Continue motrin, tylenol PRN for pain control.   -Encourage breastfeeding.  -Incisional care and PO instructions reviewed.     Follow up @ [facility] office in 7days for PP BP Check  Monitor BP @ home 3 times daily (morning/noon/night)  keep a strict blood pressure log and bring to the office 7 days after hospital discharge for review  if you have BP > 160/100 call the office for further advise  if you have headaches, vision changes, upper abdominal pain call the office to notify and go to [facility] Triage for evaluation  [phone]    Discussed with MD Rancho BURKETT      A/P: 29 yr old F POD #3 s/p p/c/s for CAT II on 2/24/2025 c/b GHTN   Concerns for PPD Elevated PHQ 9 - S/P SW consult and Psych consult: "Patient without signs of MDD, CHRISTOPHER, bipolar d/o, or psychosis. Denies suicidality/intent or plan and denies homicidality/intent or plan. Future oriented. "  Plan per Psych:  "PLAN  - patient with no SI or HI, able to discuss questionnaire response in full detail with no acute safety concerns from patient or family.   - no psychotropic medications at this time  - patient to f/u outpatient with providers from [place]   - [facility] [phone] (M-F 9am-7pm)   [facility] [phone]"    #gHTN  -BP x 24 hours: BP:  (126/65 - 145/85)  -HELLP LABS WDL   -Declined sxs of PEC   -PEC precautions   -BP cuff sent via [facility]     #Acute blood loss Anemia   -H/H12.2/36.8-->8.7/26.3-->9.1/28.4  -Iron +Vit C  -Asymptomatic         #Postpartum   Her pain is well controlled.   She is tolerating a regular diet and passing flatus.   Denies N/V. Denies CP/SOB/lightheadedness/dizziness.   She is ambulating without difficulty.   Voiding spontaneously.    Patient is stable and doing well post-operatively.    - Continue regular diet.  - Increase ambulation.  - Continue motrin, tylenol PRN for pain control.   -Encourage breastfeeding.  -Incisional care and PO instructions reviewed.     Follow up @ [facility] office in 7days for PP BP Check  Monitor BP @ home 3 times daily (morning/noon/night)  keep a strict blood pressure log and bring to the office 7 days after hospital discharge for review  if you have BP > 160/100 call the office for further advise  if you have headaches, vision changes, upper abdominal pain call the office to notify and go to [facility] Triage for evaluation  [phone]    Discussed with MD Rancho BURKETT      A/P: 29 yr old F POD #3 s/p p/c/s for CAT II on 2/24/2025 c/b GHTN   Concerns for PPD Elevated PHQ 9 - S/P SW consult and Psych consult: "Patient without signs of MDD, CHRISTOPHER, bipolar d/o, or psychosis. Denies suicidality/intent or plan and denies homicidality/intent or plan. Future oriented. "  Plan per Psych:  "PLAN  - patient with no SI or HI, able to discuss questionnaire response in full detail with no acute safety concerns from patient or family.   - no psychotropic medications at this time  - patient to f/u outpatient with providers from [place]   - [facility] [phone] (M-F 9am-7pm)   [facility] [phone]"  Pt seen by DARIELA: who provided resources for outpatient psychiatry/mental health postpartum   Pt will Follow up in [facility] in 1 week     #gHTN  -BP x 24 hours: BP:  (126/65 - 145/85)  -HELLP LABS WDL   -Declined sxs of PEC   -PEC precautions   -BP cuff sent via [facility]     #Acute blood loss Anemia   -H/H12.2/36.8-->8.7/26.3-->9.1/28.4  -Iron +Vit C  -Asymptomatic         #Postpartum   Her pain is well controlled.   She is tolerating a regular diet and passing flatus.   Denies N/V. Denies CP/SOB/lightheadedness/dizziness.   She is ambulating without difficulty.   Voiding spontaneously.    Patient is stable and doing well post-operatively.    - Continue regular diet.  - Increase ambulation.  - Continue motrin, tylenol PRN for pain control.   -Encourage breastfeeding.  -Incisional care and PO instructions reviewed.     Follow up @ [facility] office in 7days for PP BP Check  Monitor BP @ home 3 times daily (morning/noon/night)  keep a strict blood pressure log and bring to the office 7 days after hospital discharge for review  if you have BP > 160/100 call the office for further advise  if you have headaches, vision changes, upper abdominal pain call the office to notify and go to [facility] Triage for evaluation  [phone]    Discussed with MD Rancho Farfan ABDI

## 2025-02-27 NOTE — PROGRESS NOTE ADULT - SUBJECTIVE AND OBJECTIVE BOX
NP: Progress Note POD #1    Pt seen, examined at bedside and doing well meeting all post operative milestones. Patient bonding well with infant. Breastfeeding  Pt states mild abdominal pain. Pt denies fever, chills, chest pain, SOB, nausea, vomiting, lightheadedness, dizziness.  Pt states passing flatus    T(F): 97.8 (02-27-25 @ 05:36), Max: 98.8 (02-26-25 @ 21:37)  HR: 97 (02-27-25 @ 05:36) (92 - 100)  BP: 128/80 (02-27-25 @ 05:36) (126/65 - 145/85)  RR: 18 (02-27-25 @ 05:36) (17 - 18)  SpO2: 100% (02-27-25 @ 05:36) (98% - 100%)  Wt(kg): --  CAPILLARY BLOOD GLUCOSE    I&O's Detail      MEDICATIONS  (STANDING):  acetaminophen     Tablet .. 975 milliGRAM(s) Oral <User Schedule>  diphtheria/tetanus/pertussis (acellular) Vaccine (Adacel) 0.5 milliLiter(s) IntraMuscular once  ferrous    sulfate 325 milliGRAM(s) Oral daily  heparin   Injectable 5000 Unit(s) SubCutaneous every 12 hours  ibuprofen  Tablet. 600 milliGRAM(s) Oral every 6 hours  levothyroxine 50 MICROGram(s) Oral daily  prenatal multivitamin 1 Tablet(s) Oral daily  senna 2 Tablet(s) Oral at bedtime    MEDICATIONS  (PRN):  diphenhydrAMINE 25 milliGRAM(s) Oral every 6 hours PRN Pruritus  lanolin Ointment 1 Application(s) Topical every 6 hours PRN Sore Nipples  magnesium hydroxide Suspension 30 milliLiter(s) Oral two times a day PRN Constipation  oxyCODONE    IR 5 milliGRAM(s) Oral once PRN Moderate to Severe Pain (4-10)  oxyCODONE    IR 5 milliGRAM(s) Oral every 3 hours PRN Moderate to Severe Pain (4-10)  simethicone 80 milliGRAM(s) Chew every 4 hours PRN Gas      Physical Exam:  Constitutional: WDWN female, NAD AxOx3  Skin: no breakdowns noted, warm and dry  Chest: s1s2+, RRR, clear to auscultation bilaterally, no w/r/r    Breasts: soft, nonengorged, no reddness, no warmth bilaterally  Abdomen: Fundus firm, appropriate tenderness noted   Incision site: Incision clean and dry with Steri-strips / dermabond intact.  Aquacel Intact   GYN: Lochia light   Extremities: no lower extremity edema or calf tenderness bilaterally; intermittent compression stockings in place     LABS:             9.1    13.53 )-----------( 167      ( 02-25 @ 10:33 )             28.4                8.7    13.52 )-----------( 168      ( 02-25 @ 06:30 )             26.3                   Urinalysis Basic - ( 25 Feb 2025 10:29 )    Color: x / Appearance: x / SG: x / pH: x  Gluc: 97 mg/dL / Ketone: x  / Bili: x / Urobili: x   Blood: x / Protein: x / Nitrite: x   Leuk Esterase: x / RBC: x / WBC x   Sq Epi: x / Non Sq Epi: x / Bacteria: x        RADIOLOGY & ADDITIONAL TESTS:         NP: Progress Note POD #3    Pt seen, examined at bedside and doing well meeting all post operative milestones. Patient bonding well with infant. Breastfeeding  Pt states mild abdominal pain. Pt denies fever, chills, chest pain, SOB, nausea, vomiting, lightheadedness, dizziness.  Pt states passing flatus    T(F): 97.8 (02-27-25 @ 05:36), Max: 98.8 (02-26-25 @ 21:37)  HR: 97 (02-27-25 @ 05:36) (92 - 100)  BP: 128/80 (02-27-25 @ 05:36) (126/65 - 145/85)  RR: 18 (02-27-25 @ 05:36) (17 - 18)  SpO2: 100% (02-27-25 @ 05:36) (98% - 100%)  Wt(kg): --  CAPILLARY BLOOD GLUCOSE    I&O's Detail      MEDICATIONS  (STANDING):  acetaminophen     Tablet .. 975 milliGRAM(s) Oral <User Schedule>  diphtheria/tetanus/pertussis (acellular) Vaccine (Adacel) 0.5 milliLiter(s) IntraMuscular once  ferrous    sulfate 325 milliGRAM(s) Oral daily  heparin   Injectable 5000 Unit(s) SubCutaneous every 12 hours  ibuprofen  Tablet. 600 milliGRAM(s) Oral every 6 hours  levothyroxine 50 MICROGram(s) Oral daily  prenatal multivitamin 1 Tablet(s) Oral daily  senna 2 Tablet(s) Oral at bedtime    MEDICATIONS  (PRN):  diphenhydrAMINE 25 milliGRAM(s) Oral every 6 hours PRN Pruritus  lanolin Ointment 1 Application(s) Topical every 6 hours PRN Sore Nipples  magnesium hydroxide Suspension 30 milliLiter(s) Oral two times a day PRN Constipation  oxyCODONE    IR 5 milliGRAM(s) Oral once PRN Moderate to Severe Pain (4-10)  oxyCODONE    IR 5 milliGRAM(s) Oral every 3 hours PRN Moderate to Severe Pain (4-10)  simethicone 80 milliGRAM(s) Chew every 4 hours PRN Gas      Physical Exam:  Constitutional: WDWN female, NAD AxOx3  Skin: no breakdowns noted, warm and dry  Chest: s1s2+, RRR, clear to auscultation bilaterally, no w/r/r    Breasts: soft, nonengorged, no reddness, no warmth bilaterally  Abdomen: Fundus firm, appropriate tenderness noted   Incision site: Incision clean and dry with dermabond intact.     GYN: Lochia light   Extremities: no lower extremity edema or calf tenderness bilaterally; intermittent compression stockings in place     LABS:             9.1    13.53 )-----------( 167      ( 02-25 @ 10:33 )             28.4                8.7    13.52 )-----------( 168      ( 02-25 @ 06:30 )             26.3                   Urinalysis Basic - ( 25 Feb 2025 10:29 )    Color: x / Appearance: x / SG: x / pH: x  Gluc: 97 mg/dL / Ketone: x  / Bili: x / Urobili: x   Blood: x / Protein: x / Nitrite: x   Leuk Esterase: x / RBC: x / WBC x   Sq Epi: x / Non Sq Epi: x / Bacteria: x        RADIOLOGY & ADDITIONAL TESTS:

## 2025-02-28 PROBLEM — O24.419 GESTATIONAL DIABETES MELLITUS IN PREGNANCY, UNSPECIFIED CONTROL: Chronic | Status: ACTIVE | Noted: 2025-02-22

## 2025-02-28 PROBLEM — E03.9 HYPOTHYROIDISM, UNSPECIFIED: Chronic | Status: ACTIVE | Noted: 2025-02-22

## 2025-03-02 ENCOUNTER — NON-APPOINTMENT (OUTPATIENT)
Age: 30
End: 2025-03-02

## 2025-03-03 ENCOUNTER — APPOINTMENT (OUTPATIENT)
Dept: ANTEPARTUM | Facility: CLINIC | Age: 30
End: 2025-03-03

## 2025-03-03 ENCOUNTER — APPOINTMENT (OUTPATIENT)
Age: 30
End: 2025-03-03
Payer: COMMERCIAL

## 2025-03-03 ENCOUNTER — INPATIENT (INPATIENT)
Facility: HOSPITAL | Age: 30
LOS: 1 days | Discharge: HOME | End: 2025-03-05
Attending: STUDENT IN AN ORGANIZED HEALTH CARE EDUCATION/TRAINING PROGRAM | Admitting: STUDENT IN AN ORGANIZED HEALTH CARE EDUCATION/TRAINING PROGRAM
Payer: COMMERCIAL

## 2025-03-03 ENCOUNTER — RESULT REVIEW (OUTPATIENT)
Age: 30
End: 2025-03-03

## 2025-03-03 VITALS
TEMPERATURE: 99 F | RESPIRATION RATE: 15 BRPM | SYSTOLIC BLOOD PRESSURE: 122 MMHG | DIASTOLIC BLOOD PRESSURE: 67 MMHG | OXYGEN SATURATION: 99 % | HEART RATE: 126 BPM

## 2025-03-03 DIAGNOSIS — Z98.891 HISTORY OF UTERINE SCAR FROM PREVIOUS SURGERY: Chronic | ICD-10-CM

## 2025-03-03 DIAGNOSIS — O26.899 OTHER SPECIFIED PREGNANCY RELATED CONDITIONS, UNSPECIFIED TRIMESTER: ICD-10-CM

## 2025-03-03 LAB
ALBUMIN SERPL ELPH-MCNC: 3.1 G/DL — LOW (ref 3.3–5)
ALP SERPL-CCNC: 227 U/L — HIGH (ref 40–120)
ALT FLD-CCNC: 22 U/L — SIGNIFICANT CHANGE UP (ref 4–33)
ANION GAP SERPL CALC-SCNC: 14 MMOL/L — SIGNIFICANT CHANGE UP (ref 7–14)
AST SERPL-CCNC: 17 U/L — SIGNIFICANT CHANGE UP (ref 4–32)
BASOPHILS # BLD AUTO: 0 K/UL — SIGNIFICANT CHANGE UP (ref 0–0.2)
BASOPHILS NFR BLD AUTO: 0 % — SIGNIFICANT CHANGE UP (ref 0–2)
BILIRUB SERPL-MCNC: <0.2 MG/DL — SIGNIFICANT CHANGE UP (ref 0.2–1.2)
BLD GP AB SCN SERPL QL: NEGATIVE — SIGNIFICANT CHANGE UP
BUN SERPL-MCNC: 13 MG/DL — SIGNIFICANT CHANGE UP (ref 7–23)
CALCIUM SERPL-MCNC: 8.7 MG/DL — SIGNIFICANT CHANGE UP (ref 8.4–10.5)
CHLORIDE SERPL-SCNC: 109 MMOL/L — HIGH (ref 98–107)
CO2 SERPL-SCNC: 20 MMOL/L — LOW (ref 22–31)
CREAT SERPL-MCNC: 0.63 MG/DL — SIGNIFICANT CHANGE UP (ref 0.5–1.3)
EGFR: 122 ML/MIN/1.73M2 — SIGNIFICANT CHANGE UP
EGFR: 122 ML/MIN/1.73M2 — SIGNIFICANT CHANGE UP
EOSINOPHIL # BLD AUTO: 0 K/UL — SIGNIFICANT CHANGE UP (ref 0–0.5)
EOSINOPHIL NFR BLD AUTO: 0 % — SIGNIFICANT CHANGE UP (ref 0–6)
FLUAV AG NPH QL: SIGNIFICANT CHANGE UP
FLUBV AG NPH QL: SIGNIFICANT CHANGE UP
GIANT PLATELETS BLD QL SMEAR: PRESENT — SIGNIFICANT CHANGE UP
GLUCOSE SERPL-MCNC: 91 MG/DL — SIGNIFICANT CHANGE UP (ref 70–99)
HCT VFR BLD CALC: 26.5 % — LOW (ref 34.5–45)
HGB BLD-MCNC: 8.8 G/DL — LOW (ref 11.5–15.5)
IANC: 15.54 K/UL — HIGH (ref 1.8–7.4)
LDH SERPL L TO P-CCNC: 274 U/L — HIGH (ref 135–225)
LYMPHOCYTES # BLD AUTO: 1.07 K/UL — SIGNIFICANT CHANGE UP (ref 1–3.3)
LYMPHOCYTES # BLD AUTO: 5.4 % — LOW (ref 13–44)
MANUAL SMEAR VERIFICATION: SIGNIFICANT CHANGE UP
MCHC RBC-ENTMCNC: 29.5 PG — SIGNIFICANT CHANGE UP (ref 27–34)
MCHC RBC-ENTMCNC: 33.2 G/DL — SIGNIFICANT CHANGE UP (ref 32–36)
MCV RBC AUTO: 88.9 FL — SIGNIFICANT CHANGE UP (ref 80–100)
METAMYELOCYTES # FLD: 2.7 % — HIGH (ref 0–1)
METAMYELOCYTES NFR BLD: 2.7 % — HIGH (ref 0–1)
MONOCYTES # BLD AUTO: 0 K/UL — SIGNIFICANT CHANGE UP (ref 0–0.9)
MONOCYTES NFR BLD AUTO: 0 % — LOW (ref 2–14)
MYELOCYTES NFR BLD: 2.7 % — HIGH (ref 0–0)
NEUTROPHILS # BLD AUTO: 17.6 K/UL — HIGH (ref 1.8–7.4)
NEUTROPHILS NFR BLD AUTO: 81.2 % — HIGH (ref 43–77)
NEUTS BAND # BLD: 8 % — HIGH (ref 0–6)
NEUTS BAND NFR BLD: 8 % — HIGH (ref 0–6)
NRBC # BLD: 1 /100 WBCS — HIGH (ref 0–0)
NRBC BLD-RTO: 1 /100 WBCS — HIGH (ref 0–0)
NT-PROBNP SERPL-SCNC: 2401 PG/ML — HIGH
PLAT MORPH BLD: ABNORMAL
PLATELET # BLD AUTO: 334 K/UL — SIGNIFICANT CHANGE UP (ref 150–400)
PLATELET COUNT - ESTIMATE: NORMAL — SIGNIFICANT CHANGE UP
POTASSIUM SERPL-MCNC: 3.3 MMOL/L — LOW (ref 3.5–5.3)
POTASSIUM SERPL-SCNC: 3.3 MMOL/L — LOW (ref 3.5–5.3)
PROT SERPL-MCNC: 7 G/DL — SIGNIFICANT CHANGE UP (ref 6–8.3)
RBC # BLD: 2.98 M/UL — LOW (ref 3.8–5.2)
RBC # FLD: 13.9 % — SIGNIFICANT CHANGE UP (ref 10.3–14.5)
RBC BLD AUTO: NORMAL — SIGNIFICANT CHANGE UP
RH IG SCN BLD-IMP: POSITIVE — SIGNIFICANT CHANGE UP
RSV RNA NPH QL NAA+NON-PROBE: SIGNIFICANT CHANGE UP
SARS-COV-2 RNA SPEC QL NAA+PROBE: SIGNIFICANT CHANGE UP
SODIUM SERPL-SCNC: 143 MMOL/L — SIGNIFICANT CHANGE UP (ref 135–145)
URATE SERPL-MCNC: 4.3 MG/DL — SIGNIFICANT CHANGE UP (ref 2.5–7)
WBC # BLD: 19.73 K/UL — HIGH (ref 3.8–10.5)
WBC # FLD AUTO: 19.73 K/UL — HIGH (ref 3.8–10.5)

## 2025-03-03 PROCEDURE — S9443: CPT | Mod: 95

## 2025-03-03 PROCEDURE — 93010 ELECTROCARDIOGRAM REPORT: CPT

## 2025-03-03 PROCEDURE — 71045 X-RAY EXAM CHEST 1 VIEW: CPT | Mod: 26

## 2025-03-03 PROCEDURE — 93356 MYOCRD STRAIN IMG SPCKL TRCK: CPT

## 2025-03-03 PROCEDURE — 76376 3D RENDER W/INTRP POSTPROCES: CPT | Mod: 26

## 2025-03-03 PROCEDURE — 93306 TTE W/DOPPLER COMPLETE: CPT | Mod: 26

## 2025-03-03 PROCEDURE — 71275 CT ANGIOGRAPHY CHEST: CPT | Mod: 26

## 2025-03-03 RX ORDER — ACETAMINOPHEN 500 MG/5ML
975 LIQUID (ML) ORAL EVERY 6 HOURS
Refills: 0 | Status: DISCONTINUED | OUTPATIENT
Start: 2025-03-03 | End: 2025-03-05

## 2025-03-03 RX ORDER — MAGNESIUM SULFATE 500 MG/ML
4 SYRINGE (ML) INJECTION ONCE
Refills: 0 | Status: COMPLETED | OUTPATIENT
Start: 2025-03-03 | End: 2025-03-03

## 2025-03-03 RX ORDER — NIFEDIPINE 30 MG
30 TABLET, EXTENDED RELEASE 24 HR ORAL DAILY
Refills: 0 | Status: DISCONTINUED | OUTPATIENT
Start: 2025-03-03 | End: 2025-03-05

## 2025-03-03 RX ORDER — SODIUM CHLORIDE 9 G/1000ML
1000 INJECTION, SOLUTION INTRAVENOUS
Refills: 0 | Status: DISCONTINUED | OUTPATIENT
Start: 2025-03-03 | End: 2025-03-03

## 2025-03-03 RX ORDER — MELATONIN 5 MG
1 TABLET ORAL AT BEDTIME
Refills: 0 | Status: DISCONTINUED | OUTPATIENT
Start: 2025-03-03 | End: 2025-03-03

## 2025-03-03 RX ORDER — MAGNESIUM SULFATE 500 MG/ML
2 SYRINGE (ML) INJECTION
Qty: 40 | Refills: 0 | Status: DISCONTINUED | OUTPATIENT
Start: 2025-03-03 | End: 2025-03-03

## 2025-03-03 RX ORDER — ACETAMINOPHEN 500 MG/5ML
1000 LIQUID (ML) ORAL ONCE
Refills: 0 | Status: COMPLETED | OUTPATIENT
Start: 2025-03-03 | End: 2025-03-03

## 2025-03-03 RX ORDER — NIFEDIPINE 30 MG
10 TABLET, EXTENDED RELEASE 24 HR ORAL ONCE
Refills: 0 | Status: COMPLETED | OUTPATIENT
Start: 2025-03-03 | End: 2025-03-03

## 2025-03-03 RX ORDER — IBUPROFEN 200 MG
600 TABLET ORAL EVERY 6 HOURS
Refills: 0 | Status: DISCONTINUED | OUTPATIENT
Start: 2025-03-03 | End: 2025-03-05

## 2025-03-03 RX ORDER — LEVETIRACETAM 10 MG/ML
500 INJECTION, SOLUTION INTRAVENOUS
Refills: 0 | Status: DISCONTINUED | OUTPATIENT
Start: 2025-03-03 | End: 2025-03-04

## 2025-03-03 RX ORDER — FUROSEMIDE 10 MG/ML
20 INJECTION INTRAMUSCULAR; INTRAVENOUS ONCE
Refills: 0 | Status: COMPLETED | OUTPATIENT
Start: 2025-03-03 | End: 2025-03-03

## 2025-03-03 RX ORDER — MELATONIN 5 MG
3 TABLET ORAL AT BEDTIME
Refills: 0 | Status: DISCONTINUED | OUTPATIENT
Start: 2025-03-03 | End: 2025-03-05

## 2025-03-03 RX ADMIN — Medication 50 GM/HR: at 15:04

## 2025-03-03 RX ADMIN — FUROSEMIDE 20 MILLIGRAM(S): 10 INJECTION INTRAMUSCULAR; INTRAVENOUS at 17:13

## 2025-03-03 RX ADMIN — Medication 10 MILLIGRAM(S): at 13:03

## 2025-03-03 RX ADMIN — Medication 3 MILLILITER(S): at 22:00

## 2025-03-03 RX ADMIN — SODIUM CHLORIDE 50 MILLILITER(S): 9 INJECTION, SOLUTION INTRAVENOUS at 14:51

## 2025-03-03 RX ADMIN — Medication 50 GM/HR: at 15:55

## 2025-03-03 RX ADMIN — LEVETIRACETAM 500 MILLIGRAM(S): 10 INJECTION, SOLUTION INTRAVENOUS at 18:59

## 2025-03-03 RX ADMIN — Medication 3 MILLILITER(S): at 16:43

## 2025-03-03 RX ADMIN — Medication 300 GRAM(S): at 14:32

## 2025-03-03 RX ADMIN — Medication 1000 MILLIGRAM(S): at 16:30

## 2025-03-03 RX ADMIN — Medication 600 MILLIGRAM(S): at 21:15

## 2025-03-03 RX ADMIN — Medication 3 MILLIGRAM(S): at 22:16

## 2025-03-03 RX ADMIN — Medication 400 MILLIGRAM(S): at 15:09

## 2025-03-03 RX ADMIN — Medication 600 MILLIGRAM(S): at 20:37

## 2025-03-03 NOTE — H&P ADULT - ASSESSMENT
30 y/o s/p  2025 with elevated BP's @ home 165/85 151/82:  pt seen and evaluated with dr Meng / dr Moss  pt to be admitted to L&D   sPEC s/p primary  2025 for Procardia/ Magnesium Sulfate/ chest x-ray   see admission orders

## 2025-03-03 NOTE — CHART NOTE - NSCHARTNOTEFT_GEN_A_CORE
Briefly, this is a 30 y/o F with PMH of gestational HTN, MDD, anxiety, pre-eclampsia s/p  25 with cardiology consulted Briefly, this is a 30 y/o F with PMH of gestational HTN, MDD, anxiety, pre-eclampsia s/p  25 with cardiology consulted due to tachypnea, tachycardia, and patient feeling SOB.     On exam, patient with trace pitting edema of b/l LE, pro-BNP elevated to 2401, and CXR with pulmonary congestion b/l. She was tachypneic on exam but otherwise looked comfortable. Mild crackles b/l but on room air. However, tachycardia to 110-120s. Concern is for postpartum cardiomyopathy and heart failure, which we will obtain TTE for and diurese with lasix for optimization as below. Also on the differential is tachycardia 2/2 anemia with Hgb of 8.8 from 12 prior to the , as well as possible infection (pt denies fevers, chills, sore throat, N/V, diarrhea and no sick contacts) so less likely. Has been drinking 2-3 bottles of water daily, denies feeling dehydrated but reports feeling dizzy upon standing up since the .     Vitals: -120s, -120s/60s, 97% on room air     #Tachypnea   #SOB:   #BNP Elevation, B/l LE Edema, Pulm Congestion:   [] Tele monitoring   [] Check TSH, thyroid function, and continue home levothyroxine   [] Check TTE   [] Stop IVF   [] Give lasix 20mg IV x1 now  [] If she does not produce approx 1L of urine by midnight, please give additional 40mg IV dose of lasix at midnight   [] Replete K>4, Mg>2   [] Obtain strict I/Os         Patient discussed with Dr. Mccurdy. To be seen by Dr. Mccurdy and Cards-OB team formally in AM.      Joaquin Leiva MD   Cardiology Fellow Briefly, this is a 30 y/o F with PMH of gestational HTN, MDD, anxiety, pre-eclampsia s/p  25 with cardiology consulted due to tachypnea, tachycardia, and patient feeling SOB.     On exam, patient with trace pitting edema of b/l LE, pro-BNP elevated to 2401, and CXR with pulmonary congestion b/l. She was tachypneic on exam but otherwise looked comfortable. Mild crackles b/l but on room air. However, tachycardia to 110-120s. Concern is for postpartum cardiomyopathy and heart failure, which we will obtain TTE for and diurese with lasix for optimization as below. Also on the differential is tachycardia 2/2 anemia with Hgb of 8.8 from 12 prior to the , as well as possible infection (pt denies fevers, chills, sore throat, N/V, diarrhea and no sick contacts) so less likely. Could also be 2/2 anxiety (has known history but denies feeling stressed currently) or PE (although less likely as not hypoxic). Has been drinking 2-3 bottles of water daily, denies feeling dehydrated but reports feeling dizzy upon standing up since the .     Vitals: -120s, -120s/60s, 97% on room air     #Tachypnea   #SOB:   #BNP Elevation, B/l LE Edema, Pulm Congestion:   [] Tele monitoring   [] Check TSH, thyroid function, and continue home levothyroxine   [] Check TTE   [] Stop IVF   [] Give lasix 20mg IV x1 now  [] If she does not produce approx 1L of urine by midnight, please give additional 40mg IV dose of lasix at midnight   [] Replete K>4, Mg>2   [] Obtain strict I/Os         Patient discussed with Dr. Mccurdy. To be seen by Dr. Mccurdy and Cards-OB team formally in AM.      Joaquin Leiva MD   Cardiology Fellow

## 2025-03-03 NOTE — H&P ADULT - HISTORY OF PRESENT ILLNESS
PNC with [facility]   30 y/o s/p primary   2025 for a non reassuring FHT / gestational HTN , NICU admission x's 2 days 7# states had elevated BP of 165/85 and 151/82 also with c/o dizziness /lightheaded x's 1 day pt also with c/o " something with my breathing " x's 1 day denies any chest pain denies any c/o headache visual disturbances or right upper epigastric pain  pt states tolerating po fluids and solids states is breast/ bottle feeding and has support @ home  has a history of depression and anxiety and is in weekly therapy and states spoke with her therapist yesterday

## 2025-03-03 NOTE — PATIENT PROFILE ADULT - FALL HARM RISK - UNIVERSAL INTERVENTIONS
Bed in lowest position, wheels locked, appropriate side rails in place/Call bell, personal items and telephone in reach/Instruct patient to call for assistance before getting out of bed or chair/Non-slip footwear when patient is out of bed/[place] to call system/Physically safe environment - no spills, clutter or unnecessary equipment/Purposeful Proactive Rounding/Room/bathroom lighting operational, light cord in reach

## 2025-03-03 NOTE — H&P ADULT - NSICDXPASTMEDICALHX_GEN_ALL_CORE_FT
PAST MEDICAL HISTORY:  Anxiety and depression     Gestational diabetes     Gestational hypertension     Hypothyroid

## 2025-03-03 NOTE — H&P ADULT - NSHPPHYSICALEXAM_GEN_ALL_CORE
GENERAL: pt in  NAD,   HEAD:  Atraumatic, normocephalic  EYES:PERRLA, conjunctiva and sclera clear  NECK: Supple, nontender   HEART: Regular rate and rhythm,   LUNGS: Unlabored respirations.  Clear to auscultation bilaterally   ABDOMEN: Soft, nontender, gravid     abdominal incision clean dry and intact     EXTREMITIES: 2+ peripheral pulses bilaterally. No clubbing, cyanosis, or  bilateral 1 +lower extremity  edema  NERVOUS SYSTEM:  A&Ox3, moving all extremities, no focal deficits   SKIN: No rashes or lesions    vitals   1227 182/112 P: 123 R: 20 O2 Sat 100 % room air   1247 163/91 121 R: 20 O2 Sat 100 % room air   1303 nifedipine IR 10 mg   1307 168/90 P: 115 R: 20 O2 Sat: 100 %   1320 Dr Meng @ bedside   1327 128/77 P: 125 O2 SAT: 99% room air    EKG: sinus tachycardia   otherwise normal ECG

## 2025-03-04 ENCOUNTER — TRANSCRIPTION ENCOUNTER (OUTPATIENT)
Age: 30
End: 2025-03-04

## 2025-03-04 LAB
ALBUMIN SERPL ELPH-MCNC: 3 G/DL — LOW (ref 3.3–5)
ALP SERPL-CCNC: 202 U/L — HIGH (ref 40–120)
ALT FLD-CCNC: 21 U/L — SIGNIFICANT CHANGE UP (ref 4–33)
ANION GAP SERPL CALC-SCNC: 14 MMOL/L — SIGNIFICANT CHANGE UP (ref 7–14)
AST SERPL-CCNC: 19 U/L — SIGNIFICANT CHANGE UP (ref 4–32)
BASOPHILS # BLD AUTO: 0.11 K/UL — SIGNIFICANT CHANGE UP (ref 0–0.2)
BASOPHILS NFR BLD AUTO: 0.6 % — SIGNIFICANT CHANGE UP (ref 0–2)
BILIRUB SERPL-MCNC: <0.2 MG/DL — SIGNIFICANT CHANGE UP (ref 0.2–1.2)
BUN SERPL-MCNC: 13 MG/DL — SIGNIFICANT CHANGE UP (ref 7–23)
CALCIUM SERPL-MCNC: 8.1 MG/DL — LOW (ref 8.4–10.5)
CHLORIDE SERPL-SCNC: 105 MMOL/L — SIGNIFICANT CHANGE UP (ref 98–107)
CO2 SERPL-SCNC: 22 MMOL/L — SIGNIFICANT CHANGE UP (ref 22–31)
CREAT SERPL-MCNC: 0.48 MG/DL — LOW (ref 0.5–1.3)
EGFR: 130 ML/MIN/1.73M2 — SIGNIFICANT CHANGE UP
EGFR: 130 ML/MIN/1.73M2 — SIGNIFICANT CHANGE UP
EOSINOPHIL # BLD AUTO: 0.17 K/UL — SIGNIFICANT CHANGE UP (ref 0–0.5)
EOSINOPHIL NFR BLD AUTO: 1 % — SIGNIFICANT CHANGE UP (ref 0–6)
GLUCOSE SERPL-MCNC: 105 MG/DL — HIGH (ref 70–99)
HCT VFR BLD CALC: 25.8 % — LOW (ref 34.5–45)
HGB BLD-MCNC: 8.2 G/DL — LOW (ref 11.5–15.5)
IANC: 13.04 K/UL — HIGH (ref 1.8–7.4)
IMM GRANULOCYTES NFR BLD AUTO: 7.5 % — HIGH (ref 0–0.9)
LDH SERPL L TO P-CCNC: 218 U/L — SIGNIFICANT CHANGE UP (ref 135–225)
LYMPHOCYTES # BLD AUTO: 12.5 % — LOW (ref 13–44)
LYMPHOCYTES # BLD AUTO: 2.17 K/UL — SIGNIFICANT CHANGE UP (ref 1–3.3)
MCHC RBC-ENTMCNC: 28.5 PG — SIGNIFICANT CHANGE UP (ref 27–34)
MCHC RBC-ENTMCNC: 31.8 G/DL — LOW (ref 32–36)
MCV RBC AUTO: 89.6 FL — SIGNIFICANT CHANGE UP (ref 80–100)
MONOCYTES # BLD AUTO: 0.61 K/UL — SIGNIFICANT CHANGE UP (ref 0–0.9)
MONOCYTES NFR BLD AUTO: 3.5 % — SIGNIFICANT CHANGE UP (ref 2–14)
NEUTROPHILS # BLD AUTO: 13.04 K/UL — HIGH (ref 1.8–7.4)
NEUTROPHILS NFR BLD AUTO: 74.9 % — SIGNIFICANT CHANGE UP (ref 43–77)
NRBC # BLD AUTO: 0 K/UL — SIGNIFICANT CHANGE UP (ref 0–0)
NRBC # FLD: 0 K/UL — SIGNIFICANT CHANGE UP (ref 0–0)
NRBC BLD AUTO-RTO: 0 /100 WBCS — SIGNIFICANT CHANGE UP (ref 0–0)
PLATELET # BLD AUTO: 344 K/UL — SIGNIFICANT CHANGE UP (ref 150–400)
POTASSIUM SERPL-MCNC: 2.8 MMOL/L — CRITICAL LOW (ref 3.5–5.3)
POTASSIUM SERPL-SCNC: 2.8 MMOL/L — CRITICAL LOW (ref 3.5–5.3)
PROT SERPL-MCNC: 6.5 G/DL — SIGNIFICANT CHANGE UP (ref 6–8.3)
RBC # BLD: 2.88 M/UL — LOW (ref 3.8–5.2)
RBC # FLD: 14.3 % — SIGNIFICANT CHANGE UP (ref 10.3–14.5)
SODIUM SERPL-SCNC: 141 MMOL/L — SIGNIFICANT CHANGE UP (ref 135–145)
URATE SERPL-MCNC: 4.7 MG/DL — SIGNIFICANT CHANGE UP (ref 2.5–7)
WBC # BLD: 17.4 K/UL — HIGH (ref 3.8–10.5)
WBC # FLD AUTO: 17.4 K/UL — HIGH (ref 3.8–10.5)

## 2025-03-04 PROCEDURE — 93010 ELECTROCARDIOGRAM REPORT: CPT

## 2025-03-04 RX ORDER — HEPARIN SODIUM 1000 [USP'U]/ML
5000 INJECTION INTRAVENOUS; SUBCUTANEOUS EVERY 12 HOURS
Refills: 0 | Status: DISCONTINUED | OUTPATIENT
Start: 2025-03-04 | End: 2025-03-05

## 2025-03-04 RX ORDER — FUROSEMIDE 10 MG/ML
20 INJECTION INTRAMUSCULAR; INTRAVENOUS ONCE
Refills: 0 | Status: COMPLETED | OUTPATIENT
Start: 2025-03-04 | End: 2025-03-04

## 2025-03-04 RX ADMIN — Medication 975 MILLIGRAM(S): at 02:05

## 2025-03-04 RX ADMIN — Medication 30 MILLIGRAM(S): at 15:52

## 2025-03-04 RX ADMIN — Medication 975 MILLIGRAM(S): at 10:30

## 2025-03-04 RX ADMIN — Medication 3 MILLILITER(S): at 23:23

## 2025-03-04 RX ADMIN — Medication 600 MILLIGRAM(S): at 22:00

## 2025-03-04 RX ADMIN — Medication 40 MILLIEQUIVALENT(S): at 13:58

## 2025-03-04 RX ADMIN — Medication 975 MILLIGRAM(S): at 01:27

## 2025-03-04 RX ADMIN — Medication 600 MILLIGRAM(S): at 13:58

## 2025-03-04 RX ADMIN — Medication 40 MILLIEQUIVALENT(S): at 09:57

## 2025-03-04 RX ADMIN — Medication 975 MILLIGRAM(S): at 09:59

## 2025-03-04 RX ADMIN — Medication 975 MILLIGRAM(S): at 15:52

## 2025-03-04 RX ADMIN — Medication 3 MILLIGRAM(S): at 21:57

## 2025-03-04 RX ADMIN — Medication 3 MILLILITER(S): at 06:09

## 2025-03-04 RX ADMIN — FUROSEMIDE 20 MILLIGRAM(S): 10 INJECTION INTRAMUSCULAR; INTRAVENOUS at 17:59

## 2025-03-04 RX ADMIN — Medication 600 MILLIGRAM(S): at 23:00

## 2025-03-04 RX ADMIN — Medication 3 MILLILITER(S): at 15:50

## 2025-03-04 RX ADMIN — Medication 600 MILLIGRAM(S): at 06:40

## 2025-03-04 RX ADMIN — Medication 600 MILLIGRAM(S): at 06:06

## 2025-03-04 RX ADMIN — Medication 975 MILLIGRAM(S): at 21:01

## 2025-03-04 RX ADMIN — Medication 975 MILLIGRAM(S): at 20:01

## 2025-03-04 RX ADMIN — LEVETIRACETAM 500 MILLIGRAM(S): 10 INJECTION, SOLUTION INTRAVENOUS at 06:06

## 2025-03-04 RX ADMIN — HEPARIN SODIUM 5000 UNIT(S): 1000 INJECTION INTRAVENOUS; SUBCUTANEOUS at 21:57

## 2025-03-04 NOTE — PROGRESS NOTE ADULT - ASSESSMENT
28yo  POD#8 s/p pLTCS for Cat II tracing c/b gHTN, now admitted with postpartum sPEC. Patient had tachycardia on admission as well as sustained severe range BP. Workup significant for BNP 2401, CXR with mild to moderate pulmonary venous congestion, CT with atelectasis and small bilateral pleural effusion. Cardiology following. Patient is clinically improving.    #sPEC  - Keppra for 24h postpartum for seizure prophylaxis  - s/p Procardia 10 (3/3)  - On Procardia 30  - HELLP labs wnl  - Denies sxs sPEC    #Tachycardia  #SOB  - TTE (3/3): LVEF 70 %. Mild to moderate mitral regurgitation. Trace pericardial effusion. Otherwise wnl.  - CT PE (3/3): No PE. Bibasilar subsegmental atelectasis overlying small bilateral pleural effusion and inferior mosaic attenuation suggesting hypoinflation of lungs. Nonspecific mildly prominent axillary lymph nodes bilaterally,L>R.  - CXR (3/3): Mild to moderate pulmonary venous congestion. Mild cardiomegaly  - EKG: NSR      - RVP (3/3): neg    - BNP 2401  - Cardiology: Tele, Lasix 20mg IVP.  If she does not produce approx 1L of urine by midnight, please give additional 40mg IV dose of lasix at midnight   - f/u Cardio OB recs in AM    #Postpartum  - Regular diet  - PO pain meds  - HSQ for DVT ppx    Gianna Moss, PGY3  30yo  POD#8 s/p pLTCS for Cat II tracing c/b gHTN, now admitted with postpartum sPEC. Patient had tachycardia on admission as well as sustained severe range BP. Workup significant for BNP 2401, CXR with mild to moderate pulmonary venous congestion, CT with atelectasis and small bilateral pleural effusion. Cardiology following. Patient is clinically improving.    #sPEC  - Keppra for 24h postpartum for seizure prophylaxis  - s/p Procardia 10 (3/3)  - On Procardia 30  - HELLP labs wnl  - Denies sxs sPEC    #Tachycardia  #SOB  - TTE (3/3): LVEF 70 %. Mild to moderate mitral regurgitation. Trace pericardial effusion. Otherwise wnl.  - CT PE (3/3): No PE. Bibasilar subsegmental atelectasis overlying small bilateral pleural effusion and inferior mosaic attenuation suggesting hypoinflation of lungs. Nonspecific mildly prominent axillary lymph nodes bilaterally,L>R.  - CXR (3/3): Mild to moderate pulmonary venous congestion. Mild cardiomegaly  - EKG: NSR      - RVP (3/3): neg    - BNP 2401  - Cardiology: Tele, Lasix 20mg IVP.  If she does not produce approx 1L of urine by midnight, please give additional 40mg IV dose of lasix at midnight   - f/u Cardio OB recs in AM    #Postpartum  - Regular diet  - PO pain meds  - HSQ for DVT ppx    Gianna Moss, PGY3     Case reviewed, agree with plan of management.  Giovanni Vickers M.D.

## 2025-03-04 NOTE — DISCHARGE NOTE PROVIDER - NSDCMRMEDTOKEN_GEN_ALL_CORE_FT
acetaminophen 325 mg oral tablet: 3 tab(s) orally every 6 hours  ferrous sulfate 325 mg (65 mg elemental iron) oral tablet: 1 tab(s) orally once a day  ibuprofen 600 mg oral tablet: 1 tab(s) orally every 6 hours  levothyroxine 50 mcg (0.05 mg) oral tablet: 1 tab(s) orally once a day  Prenatal Multivitamins with Folic Acid 1 mg oral tablet: 1 tab(s) orally once a day   acetaminophen 325 mg oral tablet: 3 tab(s) orally every 6 hours  ferrous sulfate 325 mg (65 mg elemental iron) oral tablet: 1 tab(s) orally once a day  ibuprofen 600 mg oral tablet: 1 tab(s) orally every 6 hours  levothyroxine 50 mcg (0.05 mg) oral tablet: 1 tab(s) orally once a day  NIFEdipine 30 mg oral tablet, extended release: 1 tab(s) orally once a day Hold if BP &lt;110/60 MDD: 1  Prenatal Multivitamins with Folic Acid 1 mg oral tablet: 1 tab(s) orally once a day

## 2025-03-04 NOTE — DISCHARGE NOTE PROVIDER - NSDCFUADDAPPT_GEN_ALL_CORE_FT
- Continue BP meds as prescribed (hold is BP is under 110/60)  -Take blood pressure with at home cuff prior to taking medications; if BP is >150/90 call MD  - Return to hospital with headaches, visual changes, abdominal pain, nausea, vomiting, chest pain or shortness of breath  - Follow up with OB in 2 days for BP check  - Follow up with [facility] (email sent for follow up; call [phone])

## 2025-03-04 NOTE — DISCHARGE NOTE PROVIDER - NSDCFUSCHEDAPPT_GEN_ALL_CORE_FT
[facility] [address]  Scheduled Appointment: 03/11/2025    [facility] [address] [address]  Scheduled Appointment: 04/16/2025

## 2025-03-04 NOTE — DISCHARGE NOTE PROVIDER - HOSPITAL COURSE
28yo  readmitted on POD#7 s/p pLTCS for Cat II tracing c/b gHTN, now admitted with postpartum sPEC. Patient had tachycardia on admission as well as sustained severe range BPs. Workup significant for BNP 2401, CXR with mild to moderate pulmonary venous congestion, CT with atelectasis and small bilateral pleural effusion. Cardiology following and recommended tele monitoring and Lasix. Patient doing well symptomatically s/p Lasix. Started on Keppra x 24 hours for seizure ppx in the setting of postpartum preeclampsia.     - TTE (3/3): LVEF 70 %. Mild to moderate mitral regurgitation. Trace pericardial effusion. Otherwise wnl.  - CT PE (3/3): No PE. Bibasilar subsegmental atelectasis overlying small bilateral pleural effusion and inferior mosaic attenuation suggesting hypoinflation of lungs. Nonspecific mildly prominent axillary lymph nodes bilaterally, L>R.  - CXR (3/3): Mild to moderate pulmonary venous congestion. Mild cardiomegaly  - EKG: NSR      - RVP (3/3): neg    - BNP 2401     30yo  readmitted on POD#7 s/p pLTCS for Cat II tracing c/b gHTN, now admitted with postpartum sPEC. Patient had tachycardia on admission as well as sustained severe range BPs. Workup significant for BNP 2401, CXR with mild to moderate pulmonary venous congestion, CT with atelectasis and small bilateral pleural effusion. Cardiology following and recommended tele monitoring and Lasix. Patient doing well symptomatically s/p Lasix. Started on Keppra x 24 hours for seizure ppx in the setting of postpartum preeclampsia.     - TTE (3/3): LVEF 70 %. Mild to moderate mitral regurgitation. Trace pericardial effusion. Otherwise wnl.  - CT PE (3/3): No PE. Bibasilar subsegmental atelectasis overlying small bilateral pleural effusion and inferior mosaic attenuation suggesting hypoinflation of lungs. Nonspecific mildly prominent axillary lymph nodes bilaterally, L>R.  - CXR (3/3): Mild to moderate pulmonary venous congestion. Mild cardiomegaly  - EKG: NSR      - RVP (3/3): neg    - BNP 2401    On day of discharge, pro-BNP downtrending. HELLP labs WNL. Patient to follow up with cardio-OB outpatient.

## 2025-03-04 NOTE — DISCHARGE NOTE PROVIDER - CARE PROVIDER_API CALL
Deepika Meng  Obstetrics and Gynecology  [address] [address] [place] [address], [address] [address]  Phone: [phone]  Fax: [phone]  Follow Up Time:

## 2025-03-04 NOTE — PROGRESS NOTE ADULT - SUBJECTIVE AND OBJECTIVE BOX
Postpartum Readmit    Subjective:   Pt seen and examined at bedside. No events overnight. Patient reports palpitations on exertion. Denies chest pain, SOB, dizziness, lightheadedness. Denies sxs sPEC: HA, change in vision, SOB, RUQ pain    Objective:  T(F): 97.6 (03-04-25 @ 06:14), Max: 98.6 (03-03-25 @ 14:32)  HR: 94 (03-04-25 @ 06:14) (94 - 126)  BP: 104/62 (03-04-25 @ 06:14) (104/62 - 124/78)  RR: 18 (03-04-25 @ 06:14) (14 - 18)  SpO2: 99% (03-04-25 @ 06:14) (95% - 99%)  Wt(kg): --  I&O's Summary    03 Mar 2025 07:01  -  04 Mar 2025 07:00  --------------------------------------------------------  IN: 1200 mL / OUT: 2150 mL / NET: -950 mL        MEDICATIONS  (STANDING):  acetaminophen     Tablet .. 975 milliGRAM(s) Oral every 6 hours  ibuprofen  Tablet. 600 milliGRAM(s) Oral every 6 hours  levETIRAcetam 500 milliGRAM(s) Oral two times a day  melatonin 3 milliGRAM(s) Oral at bedtime  NIFEdipine XL 30 milliGRAM(s) Oral daily  potassium chloride    Tablet ER 40 milliEquivalent(s) Oral every 4 hours  sodium chloride 0.9% lock flush 3 milliLiter(s) IV Push every 8 hours    MEDICATIONS  (PRN):      Physical Exam:  Constitutional: NAD, A+O x3  CV: RRR  Lungs: clear to auscultation bilaterally  Abdomen: soft, nondistended, no guarding, no rebound, normal bowel sounds  Incision: clean, dry, intact  Extremities: no lower extremity edema or calf tenderness bilaterally; venodynes in place    LABS:            8.2      17.40 )------------( 344        ( 03-04-25 @ 05:48 )            25.8            8.8      19.73 )------------( 334        ( 03-03-25 @ 12:40 )            26.5    03-04    141    |  105    |  13     ----------------------------<  105[H]  2.8[LL]   |  22     |  0.48[L]  03-03    143    |  109[H]  |  13     ----------------------------<  91     3.3[L]   |  20[L]  |  0.63     Ca    8.1[L]      04 Mar 2025 05:48  Ca    8.7        03 Mar 2025 12:40    TPro  6.5    /  Alb  3.0[L]  /  TBili  <0.2   /  DBili  x      /  AST  19     /  ALT  21     /  AlkPhos  202[H]  03-04  TPro  7.0    /  Alb  3.1[L]  /  TBili  <0.2   /  DBili  x      /  AST  17     /  ALT  22     /  AlkPhos  227[H]  03-03          Urinalysis Basic - ( 04 Mar 2025 05:48 )    Color: x / Appearance: x / SG: x / pH: x  Gluc: 105 mg/dL / Ketone: x  / Bili: x / Urobili: x   Blood: x / Protein: x / Nitrite: x   Leuk Esterase: x / RBC: x / WBC x   Sq Epi: x / Non Sq Epi: x / Bacteria: x

## 2025-03-05 ENCOUNTER — TRANSCRIPTION ENCOUNTER (OUTPATIENT)
Age: 30
End: 2025-03-05

## 2025-03-05 VITALS
OXYGEN SATURATION: 100 % | SYSTOLIC BLOOD PRESSURE: 145 MMHG | RESPIRATION RATE: 17 BRPM | HEART RATE: 105 BPM | DIASTOLIC BLOOD PRESSURE: 88 MMHG | TEMPERATURE: 99 F

## 2025-03-05 LAB
ALBUMIN SERPL ELPH-MCNC: 3.4 G/DL — SIGNIFICANT CHANGE UP (ref 3.3–5)
ALBUMIN SERPL ELPH-MCNC: 3.4 G/DL — SIGNIFICANT CHANGE UP (ref 3.3–5)
ALP SERPL-CCNC: 210 U/L — HIGH (ref 40–120)
ALP SERPL-CCNC: 217 U/L — HIGH (ref 40–120)
ALT FLD-CCNC: 33 U/L — SIGNIFICANT CHANGE UP (ref 4–33)
ALT FLD-CCNC: 33 U/L — SIGNIFICANT CHANGE UP (ref 4–33)
ANION GAP SERPL CALC-SCNC: 14 MMOL/L — SIGNIFICANT CHANGE UP (ref 7–14)
ANION GAP SERPL CALC-SCNC: 15 MMOL/L — HIGH (ref 7–14)
AST SERPL-CCNC: 28 U/L — SIGNIFICANT CHANGE UP (ref 4–32)
AST SERPL-CCNC: 29 U/L — SIGNIFICANT CHANGE UP (ref 4–32)
BASOPHILS # BLD AUTO: 0.12 K/UL — SIGNIFICANT CHANGE UP (ref 0–0.2)
BASOPHILS NFR BLD AUTO: 0.5 % — SIGNIFICANT CHANGE UP (ref 0–2)
BILIRUB SERPL-MCNC: 0.2 MG/DL — SIGNIFICANT CHANGE UP (ref 0.2–1.2)
BILIRUB SERPL-MCNC: <0.2 MG/DL — SIGNIFICANT CHANGE UP (ref 0.2–1.2)
BUN SERPL-MCNC: 12 MG/DL — SIGNIFICANT CHANGE UP (ref 7–23)
BUN SERPL-MCNC: 14 MG/DL — SIGNIFICANT CHANGE UP (ref 7–23)
CALCIUM SERPL-MCNC: 9 MG/DL — SIGNIFICANT CHANGE UP (ref 8.4–10.5)
CALCIUM SERPL-MCNC: 9 MG/DL — SIGNIFICANT CHANGE UP (ref 8.4–10.5)
CHLORIDE SERPL-SCNC: 103 MMOL/L — SIGNIFICANT CHANGE UP (ref 98–107)
CHLORIDE SERPL-SCNC: 103 MMOL/L — SIGNIFICANT CHANGE UP (ref 98–107)
CO2 SERPL-SCNC: 21 MMOL/L — LOW (ref 22–31)
CO2 SERPL-SCNC: 22 MMOL/L — SIGNIFICANT CHANGE UP (ref 22–31)
CREAT SERPL-MCNC: 0.44 MG/DL — LOW (ref 0.5–1.3)
CREAT SERPL-MCNC: 0.57 MG/DL — SIGNIFICANT CHANGE UP (ref 0.5–1.3)
EGFR: 125 ML/MIN/1.73M2 — SIGNIFICANT CHANGE UP
EGFR: 125 ML/MIN/1.73M2 — SIGNIFICANT CHANGE UP
EGFR: 133 ML/MIN/1.73M2 — SIGNIFICANT CHANGE UP
EGFR: 133 ML/MIN/1.73M2 — SIGNIFICANT CHANGE UP
EOSINOPHIL # BLD AUTO: 0.12 K/UL — SIGNIFICANT CHANGE UP (ref 0–0.5)
EOSINOPHIL NFR BLD AUTO: 0.5 % — SIGNIFICANT CHANGE UP (ref 0–6)
GLUCOSE SERPL-MCNC: 77 MG/DL — SIGNIFICANT CHANGE UP (ref 70–99)
GLUCOSE SERPL-MCNC: 80 MG/DL — SIGNIFICANT CHANGE UP (ref 70–99)
HCT VFR BLD CALC: 30.3 % — LOW (ref 34.5–45)
HGB BLD-MCNC: 9.7 G/DL — LOW (ref 11.5–15.5)
IANC: 18.13 K/UL — HIGH (ref 1.8–7.4)
IMM GRANULOCYTES NFR BLD AUTO: 5.5 % — HIGH (ref 0–0.9)
LYMPHOCYTES # BLD AUTO: 2.04 K/UL — SIGNIFICANT CHANGE UP (ref 1–3.3)
LYMPHOCYTES # BLD AUTO: 9.2 % — LOW (ref 13–44)
MAGNESIUM SERPL-MCNC: 1.8 MG/DL — SIGNIFICANT CHANGE UP (ref 1.6–2.6)
MCHC RBC-ENTMCNC: 28.8 PG — SIGNIFICANT CHANGE UP (ref 27–34)
MCHC RBC-ENTMCNC: 32 G/DL — SIGNIFICANT CHANGE UP (ref 32–36)
MCV RBC AUTO: 89.9 FL — SIGNIFICANT CHANGE UP (ref 80–100)
MONOCYTES # BLD AUTO: 0.64 K/UL — SIGNIFICANT CHANGE UP (ref 0–0.9)
MONOCYTES NFR BLD AUTO: 2.9 % — SIGNIFICANT CHANGE UP (ref 2–14)
NEUTROPHILS # BLD AUTO: 18.13 K/UL — HIGH (ref 1.8–7.4)
NEUTROPHILS NFR BLD AUTO: 81.4 % — HIGH (ref 43–77)
NRBC # BLD AUTO: 0 K/UL — SIGNIFICANT CHANGE UP (ref 0–0)
NRBC # FLD: 0 K/UL — SIGNIFICANT CHANGE UP (ref 0–0)
NRBC BLD AUTO-RTO: 0 /100 WBCS — SIGNIFICANT CHANGE UP (ref 0–0)
NT-PROBNP SERPL-SCNC: 418 PG/ML — HIGH
PHOSPHATE SERPL-MCNC: 3.6 MG/DL — SIGNIFICANT CHANGE UP (ref 2.5–4.5)
PLATELET # BLD AUTO: 441 K/UL — HIGH (ref 150–400)
POTASSIUM SERPL-MCNC: 4.2 MMOL/L — SIGNIFICANT CHANGE UP (ref 3.5–5.3)
POTASSIUM SERPL-MCNC: 4.5 MMOL/L — SIGNIFICANT CHANGE UP (ref 3.5–5.3)
POTASSIUM SERPL-SCNC: 4.2 MMOL/L — SIGNIFICANT CHANGE UP (ref 3.5–5.3)
POTASSIUM SERPL-SCNC: 4.5 MMOL/L — SIGNIFICANT CHANGE UP (ref 3.5–5.3)
PROT SERPL-MCNC: 7.4 G/DL — SIGNIFICANT CHANGE UP (ref 6–8.3)
PROT SERPL-MCNC: 7.5 G/DL — SIGNIFICANT CHANGE UP (ref 6–8.3)
RBC # BLD: 3.37 M/UL — LOW (ref 3.8–5.2)
RBC # FLD: 14.1 % — SIGNIFICANT CHANGE UP (ref 10.3–14.5)
SODIUM SERPL-SCNC: 138 MMOL/L — SIGNIFICANT CHANGE UP (ref 135–145)
SODIUM SERPL-SCNC: 140 MMOL/L — SIGNIFICANT CHANGE UP (ref 135–145)
URATE SERPL-MCNC: 4.2 MG/DL — SIGNIFICANT CHANGE UP (ref 2.5–7)
WBC # BLD: 22.28 K/UL — HIGH (ref 3.8–10.5)
WBC # FLD AUTO: 22.28 K/UL — HIGH (ref 3.8–10.5)

## 2025-03-05 RX ORDER — LEVOTHYROXINE SODIUM 300 MCG
50 TABLET ORAL DAILY
Refills: 0 | Status: DISCONTINUED | OUTPATIENT
Start: 2025-03-05 | End: 2025-03-05

## 2025-03-05 RX ORDER — FUROSEMIDE 10 MG/ML
20 INJECTION INTRAMUSCULAR; INTRAVENOUS ONCE
Refills: 0 | Status: COMPLETED | OUTPATIENT
Start: 2025-03-05 | End: 2025-03-05

## 2025-03-05 RX ORDER — NIFEDIPINE 30 MG
1 TABLET, EXTENDED RELEASE 24 HR ORAL
Qty: 30 | Refills: 0
Start: 2025-03-05 | End: 2025-04-03

## 2025-03-05 RX ORDER — MAGNESIUM OXIDE 400 MG
400 TABLET ORAL ONCE
Refills: 0 | Status: COMPLETED | OUTPATIENT
Start: 2025-03-05 | End: 2025-03-05

## 2025-03-05 RX ADMIN — Medication 600 MILLIGRAM(S): at 06:15

## 2025-03-05 RX ADMIN — FUROSEMIDE 20 MILLIGRAM(S): 10 INJECTION INTRAMUSCULAR; INTRAVENOUS at 14:47

## 2025-03-05 RX ADMIN — Medication 50 MICROGRAM(S): at 06:10

## 2025-03-05 RX ADMIN — Medication 3 MILLILITER(S): at 06:15

## 2025-03-05 RX ADMIN — Medication 600 MILLIGRAM(S): at 05:25

## 2025-03-05 RX ADMIN — Medication 3 MILLILITER(S): at 14:38

## 2025-03-05 RX ADMIN — HEPARIN SODIUM 5000 UNIT(S): 1000 INJECTION INTRAVENOUS; SUBCUTANEOUS at 10:12

## 2025-03-05 RX ADMIN — Medication 400 MILLIGRAM(S): at 10:12

## 2025-03-05 RX ADMIN — Medication 600 MILLIGRAM(S): at 17:03

## 2025-03-05 RX ADMIN — Medication 975 MILLIGRAM(S): at 01:43

## 2025-03-05 RX ADMIN — Medication 975 MILLIGRAM(S): at 02:43

## 2025-03-05 RX ADMIN — Medication 600 MILLIGRAM(S): at 12:00

## 2025-03-05 RX ADMIN — Medication 975 MILLIGRAM(S): at 14:35

## 2025-03-05 RX ADMIN — Medication 975 MILLIGRAM(S): at 13:41

## 2025-03-05 RX ADMIN — Medication 600 MILLIGRAM(S): at 18:00

## 2025-03-05 RX ADMIN — Medication 975 MILLIGRAM(S): at 08:11

## 2025-03-05 RX ADMIN — Medication 975 MILLIGRAM(S): at 09:00

## 2025-03-05 RX ADMIN — Medication 600 MILLIGRAM(S): at 11:18

## 2025-03-05 RX ADMIN — Medication 30 MILLIGRAM(S): at 06:10

## 2025-03-05 NOTE — DISCHARGE NOTE NURSING/CASE MANAGEMENT/SOCIAL WORK - NSDCFUADDAPPT_GEN_ALL_CORE_FT
- Continue BP meds as prescribed (hold is BP is under 110/60)  -Take blood pressure with at home cuff prior to taking medications; if BP is >150/90 call MD  - Return to hospital with headaches, visual changes, abdominal pain, nausea, vomiting, chest pain or shortness of breath  - Follow up with OB in 2 days for BP check  - Follow up with [facility] (email sent for follow up; call [phone])

## 2025-03-05 NOTE — DISCHARGE NOTE NURSING/CASE MANAGEMENT/SOCIAL WORK - NSDCPEFALRISK_GEN_ALL_CORE
For information on Fall & Injury Prevention, visit: [email or web address] OR  [email or web address] OR  https://www.cdc.gov/steadi/patient.html

## 2025-03-05 NOTE — DISCHARGE NOTE NURSING/CASE MANAGEMENT/SOCIAL WORK - NSSCTYPOFSERV_GEN_ALL_CORE
RN to see patient at home on 3/6/2025. Patient's insurance stated she is 100% covered per central intake.

## 2025-03-05 NOTE — PROGRESS NOTE ADULT - SUBJECTIVE AND OBJECTIVE BOX
Postpartum Readmit    Subjective:   Pt seen and examined at bedside. Patient reports SOB on exertion and cough. Denies chest pain, palpitations. Denies other symptoms of sPEC: headache, changes in vision, RUQ/epigastric pain.      Objective:  T(F): 97.3 (03-05-25 @ 05:53), Max: 98.9 (03-04-25 @ 22:04)  HR: 94 (03-05-25 @ 05:53) (94 - 108)  BP: 117/65 (03-05-25 @ 05:53) (117/65 - 141/89)  RR: 18 (03-05-25 @ 05:53) (15 - 19)  SpO2: 99% (03-05-25 @ 05:53) (99% - 100%)  Wt(kg): --  I&O's Summary    04 Mar 2025 07:01  -  05 Mar 2025 07:00  --------------------------------------------------------  IN: 1200 mL / OUT: 3400 mL / NET: -2200 mL        MEDICATIONS  (STANDING):  acetaminophen     Tablet .. 975 milliGRAM(s) Oral every 6 hours  heparin   Injectable 5000 Unit(s) SubCutaneous every 12 hours  ibuprofen  Tablet. 600 milliGRAM(s) Oral every 6 hours  levothyroxine 50 MICROGram(s) Oral daily  melatonin 3 milliGRAM(s) Oral at bedtime  NIFEdipine XL 30 milliGRAM(s) Oral daily  sodium chloride 0.9% lock flush 3 milliLiter(s) IV Push every 8 hours    MEDICATIONS  (PRN):      Physical Exam:  Constitutional: NAD, A+O x3  Abdomen: soft, nondistended, no guarding, no rebound  Incision: clean, dry, intact  Extremities: no lower extremity edema or calf tenderness bilaterally    LABS:    03-04    141    |  105    |  13     ----------------------------<  105[H]  2.8[LL]   |  22     |  0.48[L]    Ca    8.1[L]      04 Mar 2025 05:48    TPro  6.5    /  Alb  3.0[L]  /  TBili  <0.2   /  DBili  x      /  AST  19     /  ALT  21     /  AlkPhos  202[H]  03-04          Urinalysis Basic - ( 04 Mar 2025 05:48 )    Color: x / Appearance: x / SG: x / pH: x  Gluc: 105 mg/dL / Ketone: x  / Bili: x / Urobili: x   Blood: x / Protein: x / Nitrite: x   Leuk Esterase: x / RBC: x / WBC x   Sq Epi: x / Non Sq Epi: x / Bacteria: x

## 2025-03-05 NOTE — DISCHARGE NOTE NURSING/CASE MANAGEMENT/SOCIAL WORK - PATIENT PORTAL LINK FT
You can access the FollowMyHealth Patient Portal offered by [facility] by registering at the following website: [email or web address]. By joining [employer]’s FollowMyHealth portal, you will also be able to view your health information using other applications (apps) compatible with our system.

## 2025-03-05 NOTE — PROGRESS NOTE ADULT - ASSESSMENT
30yo  POD#9 s/p pLTCS for Cat II tracing c/b gHTN, now admitted with postpartum sPEC. Patient had tachycardia on admission as well as sustained severe range BP. Workup significant for BNP 2401, CXR with mild to moderate pulmonary venous congestion, CT with atelectasis and small bilateral pleural effusion. Cardiology following. Patient is clinically improving.    #sPEC  - s/p Keppra for 24h (3/3-) for seizure prophylaxis. Mg initiated but discontinued shortly after  SOB.  - s/p Procardia 10 (3/3)  - On Procardia 30  - HELLP labs wnl  - Denies sxs sPEC    #Tachycardia  #SOB  - TTE (3/3): LVEF 70 %. Mild to moderate mitral regurgitation. Trace pericardial effusion. Otherwise wnl.  - CT PE (3/3): No PE. Bibasilar subsegmental atelectasis overlying small bilateral pleural effusion and inferior mosaic attenuation suggesting hypoinflation of lungs. Nonspecific mildly prominent axillary lymph nodes bilaterally,L>R.  - CXR (3/3): Mild to moderate pulmonary venous congestion. Mild cardiomegaly  - EKG: NSR      - RVP (3/3): neg    - BNP 2401  - Cardiology: Tele, Lasix 20mg IVP.  If she does not produce approx 1L of urine by midnight, please give additional 40mg IV dose of lasix at midnight   - Cardio OB: Additional dose of Lasix    #Postpartum  - Regular diet  - PO pain meds  - HSQ for DVT ppx    Gianna Moss, PGY3

## 2025-03-05 NOTE — PROGRESS NOTE ADULT - ATTENDING COMMENTS
patient is doing well  use incentive spirometry as instructed   blood pressure well controlled   patient is diuresing well  d/c planning    Ob CARDS FOLLOWING

## 2025-03-06 DIAGNOSIS — O16.9 UNSPECIFIED MATERNAL HYPERTENSION, UNSPECIFIED TRIMESTER: ICD-10-CM

## 2025-03-06 PROBLEM — O13.9 GESTATIONAL [PREGNANCY-INDUCED] HYPERTENSION WITHOUT SIGNIFICANT PROTEINURIA, UNSPECIFIED TRIMESTER: Chronic | Status: ACTIVE | Noted: 2025-03-03

## 2025-03-10 ENCOUNTER — INPATIENT (INPATIENT)
Facility: HOSPITAL | Age: 30
LOS: 3 days | Discharge: HOME | End: 2025-03-14
Attending: STUDENT IN AN ORGANIZED HEALTH CARE EDUCATION/TRAINING PROGRAM | Admitting: STUDENT IN AN ORGANIZED HEALTH CARE EDUCATION/TRAINING PROGRAM
Payer: COMMERCIAL

## 2025-03-10 VITALS
OXYGEN SATURATION: 99 % | HEART RATE: 95 BPM | TEMPERATURE: 98 F | WEIGHT: 134.04 LBS | DIASTOLIC BLOOD PRESSURE: 76 MMHG | HEIGHT: 57 IN | SYSTOLIC BLOOD PRESSURE: 115 MMHG | RESPIRATION RATE: 18 BRPM

## 2025-03-10 DIAGNOSIS — Z98.891 HISTORY OF UTERINE SCAR FROM PREVIOUS SURGERY: Chronic | ICD-10-CM

## 2025-03-10 LAB
ALBUMIN SERPL ELPH-MCNC: 3.2 G/DL — LOW (ref 3.3–5)
ALP SERPL-CCNC: 220 U/L — HIGH (ref 40–120)
ALT FLD-CCNC: 41 U/L — HIGH (ref 4–33)
ANION GAP SERPL CALC-SCNC: 17 MMOL/L — HIGH (ref 7–14)
APPEARANCE UR: ABNORMAL
AST SERPL-CCNC: 32 U/L — SIGNIFICANT CHANGE UP (ref 4–32)
BACTERIA # UR AUTO: ABNORMAL /HPF
BASOPHILS # BLD AUTO: 0.07 K/UL — SIGNIFICANT CHANGE UP (ref 0–0.2)
BASOPHILS NFR BLD AUTO: 0.4 % — SIGNIFICANT CHANGE UP (ref 0–2)
BILIRUB SERPL-MCNC: 0.2 MG/DL — SIGNIFICANT CHANGE UP (ref 0.2–1.2)
BILIRUB UR-MCNC: NEGATIVE — SIGNIFICANT CHANGE UP
BLOOD GAS VENOUS COMPREHENSIVE RESULT: SIGNIFICANT CHANGE UP
BUN SERPL-MCNC: 11 MG/DL — SIGNIFICANT CHANGE UP (ref 7–23)
CALCIUM SERPL-MCNC: 9.5 MG/DL — SIGNIFICANT CHANGE UP (ref 8.4–10.5)
CAST: 2 /LPF — SIGNIFICANT CHANGE UP (ref 0–4)
CHLORIDE SERPL-SCNC: 102 MMOL/L — SIGNIFICANT CHANGE UP (ref 98–107)
CO2 SERPL-SCNC: 18 MMOL/L — LOW (ref 22–31)
COLOR SPEC: YELLOW — SIGNIFICANT CHANGE UP
CREAT SERPL-MCNC: 0.52 MG/DL — SIGNIFICANT CHANGE UP (ref 0.5–1.3)
DIFF PNL FLD: ABNORMAL
EGFR: 127 ML/MIN/1.73M2 — SIGNIFICANT CHANGE UP
EGFR: 127 ML/MIN/1.73M2 — SIGNIFICANT CHANGE UP
EOSINOPHIL # BLD AUTO: 0.09 K/UL — SIGNIFICANT CHANGE UP (ref 0–0.5)
EOSINOPHIL NFR BLD AUTO: 0.5 % — SIGNIFICANT CHANGE UP (ref 0–6)
GLUCOSE SERPL-MCNC: 84 MG/DL — SIGNIFICANT CHANGE UP (ref 70–99)
GLUCOSE UR QL: NEGATIVE MG/DL — SIGNIFICANT CHANGE UP
HCT VFR BLD CALC: 27.5 % — LOW (ref 34.5–45)
HGB BLD-MCNC: 9 G/DL — LOW (ref 11.5–15.5)
IANC: 15.08 K/UL — HIGH (ref 1.8–7.4)
IMM GRANULOCYTES NFR BLD AUTO: 3.2 % — HIGH (ref 0–0.9)
KETONES UR-MCNC: NEGATIVE MG/DL — SIGNIFICANT CHANGE UP
LEUKOCYTE ESTERASE UR-ACNC: ABNORMAL
LYMPHOCYTES # BLD AUTO: 11.4 % — LOW (ref 13–44)
LYMPHOCYTES # BLD AUTO: 2.16 K/UL — SIGNIFICANT CHANGE UP (ref 1–3.3)
MCHC RBC-ENTMCNC: 28.8 PG — SIGNIFICANT CHANGE UP (ref 27–34)
MCHC RBC-ENTMCNC: 32.7 G/DL — SIGNIFICANT CHANGE UP (ref 32–36)
MCV RBC AUTO: 88.1 FL — SIGNIFICANT CHANGE UP (ref 80–100)
MONOCYTES # BLD AUTO: 0.93 K/UL — HIGH (ref 0–0.9)
MONOCYTES NFR BLD AUTO: 4.9 % — SIGNIFICANT CHANGE UP (ref 2–14)
NEUTROPHILS # BLD AUTO: 15.08 K/UL — HIGH (ref 1.8–7.4)
NEUTROPHILS NFR BLD AUTO: 79.6 % — HIGH (ref 43–77)
NITRITE UR-MCNC: NEGATIVE — SIGNIFICANT CHANGE UP
NRBC # BLD AUTO: 0 K/UL — SIGNIFICANT CHANGE UP (ref 0–0)
NRBC # FLD: 0 K/UL — SIGNIFICANT CHANGE UP (ref 0–0)
NRBC BLD AUTO-RTO: 0 /100 WBCS — SIGNIFICANT CHANGE UP (ref 0–0)
PH UR: 7 — SIGNIFICANT CHANGE UP (ref 5–8)
PLATELET # BLD AUTO: 552 K/UL — HIGH (ref 150–400)
POTASSIUM SERPL-MCNC: 4.1 MMOL/L — SIGNIFICANT CHANGE UP (ref 3.5–5.3)
POTASSIUM SERPL-SCNC: 4.1 MMOL/L — SIGNIFICANT CHANGE UP (ref 3.5–5.3)
PROT SERPL-MCNC: 7.9 G/DL — SIGNIFICANT CHANGE UP (ref 6–8.3)
PROT UR-MCNC: 30 MG/DL
RBC # BLD: 3.12 M/UL — LOW (ref 3.8–5.2)
RBC # FLD: 14.3 % — SIGNIFICANT CHANGE UP (ref 10.3–14.5)
RBC CASTS # UR COMP ASSIST: 11 /HPF — HIGH (ref 0–4)
REVIEW: SIGNIFICANT CHANGE UP
SODIUM SERPL-SCNC: 137 MMOL/L — SIGNIFICANT CHANGE UP (ref 135–145)
SP GR SPEC: 1.01 — SIGNIFICANT CHANGE UP (ref 1–1.03)
SQUAMOUS # UR AUTO: 10 /HPF — HIGH (ref 0–5)
UROBILINOGEN FLD QL: 0.2 MG/DL — SIGNIFICANT CHANGE UP (ref 0.2–1)
WBC # BLD: 18.93 K/UL — HIGH (ref 3.8–10.5)
WBC # FLD AUTO: 18.93 K/UL — HIGH (ref 3.8–10.5)
WBC UR QL: 1431 /HPF — HIGH (ref 0–5)

## 2025-03-10 PROCEDURE — 99285 EMERGENCY DEPT VISIT HI MDM: CPT

## 2025-03-10 PROCEDURE — 74177 CT ABD & PELVIS W/CONTRAST: CPT | Mod: 26

## 2025-03-10 RX ORDER — PIPERACILLIN-TAZO-DEXTROSE,ISO 3.375G/5
3.38 IV SOLUTION, PIGGYBACK PREMIX FROZEN(ML) INTRAVENOUS EVERY 8 HOURS
Refills: 0 | Status: DISCONTINUED | OUTPATIENT
Start: 2025-03-10 | End: 2025-03-14

## 2025-03-10 RX ORDER — VANCOMYCIN HCL IN 5 % DEXTROSE 1.5G/250ML
1000 PLASTIC BAG, INJECTION (ML) INTRAVENOUS EVERY 12 HOURS
Refills: 0 | Status: DISCONTINUED | OUTPATIENT
Start: 2025-03-11 | End: 2025-03-14

## 2025-03-10 RX ORDER — HYDROMORPHONE/SOD CHLOR,ISO/PF 2 MG/10 ML
1 SYRINGE (ML) INJECTION ONCE
Refills: 0 | Status: DISCONTINUED | OUTPATIENT
Start: 2025-03-10 | End: 2025-03-10

## 2025-03-10 RX ORDER — LEVOTHYROXINE SODIUM 300 MCG
50 TABLET ORAL DAILY
Refills: 0 | Status: DISCONTINUED | OUTPATIENT
Start: 2025-03-10 | End: 2025-03-14

## 2025-03-10 RX ORDER — SERTRALINE 100 MG/1
25 TABLET, FILM COATED ORAL DAILY
Refills: 0 | Status: DISCONTINUED | OUTPATIENT
Start: 2025-03-10 | End: 2025-03-13

## 2025-03-10 RX ORDER — ACETAMINOPHEN 500 MG/5ML
975 LIQUID (ML) ORAL EVERY 6 HOURS
Refills: 0 | Status: DISCONTINUED | OUTPATIENT
Start: 2025-03-10 | End: 2025-03-14

## 2025-03-10 RX ORDER — PIPERACILLIN-TAZO-DEXTROSE,ISO 3.375G/5
3.38 IV SOLUTION, PIGGYBACK PREMIX FROZEN(ML) INTRAVENOUS ONCE
Refills: 0 | Status: COMPLETED | OUTPATIENT
Start: 2025-03-10 | End: 2025-03-10

## 2025-03-10 RX ORDER — MODIFIED LANOLIN 100 %
1 CREAM (GRAM) TOPICAL EVERY 6 HOURS
Refills: 0 | Status: DISCONTINUED | OUTPATIENT
Start: 2025-03-10 | End: 2025-03-14

## 2025-03-10 RX ORDER — NIFEDIPINE 30 MG
30 TABLET, EXTENDED RELEASE 24 HR ORAL DAILY
Refills: 0 | Status: DISCONTINUED | OUTPATIENT
Start: 2025-03-10 | End: 2025-03-14

## 2025-03-10 RX ORDER — VANCOMYCIN HCL IN 5 % DEXTROSE 1.5G/250ML
1000 PLASTIC BAG, INJECTION (ML) INTRAVENOUS ONCE
Refills: 0 | Status: COMPLETED | OUTPATIENT
Start: 2025-03-10 | End: 2025-03-10

## 2025-03-10 RX ORDER — OXYCODONE HYDROCHLORIDE 30 MG/1
5 TABLET ORAL EVERY 4 HOURS
Refills: 0 | Status: DISCONTINUED | OUTPATIENT
Start: 2025-03-10 | End: 2025-03-14

## 2025-03-10 RX ORDER — IBUPROFEN 200 MG
600 TABLET ORAL EVERY 6 HOURS
Refills: 0 | Status: DISCONTINUED | OUTPATIENT
Start: 2025-03-10 | End: 2025-03-14

## 2025-03-10 RX ORDER — HYDROMORPHONE/SOD CHLOR,ISO/PF 2 MG/10 ML
0.5 SYRINGE (ML) INJECTION
Refills: 0 | Status: DISCONTINUED | OUTPATIENT
Start: 2025-03-10 | End: 2025-03-14

## 2025-03-10 RX ORDER — SIMETHICONE 80 MG
80 TABLET,CHEWABLE ORAL EVERY 4 HOURS
Refills: 0 | Status: DISCONTINUED | OUTPATIENT
Start: 2025-03-10 | End: 2025-03-14

## 2025-03-10 RX ORDER — HEPARIN SODIUM 1000 [USP'U]/ML
5000 INJECTION INTRAVENOUS; SUBCUTANEOUS EVERY 12 HOURS
Refills: 0 | Status: DISCONTINUED | OUTPATIENT
Start: 2025-03-10 | End: 2025-03-14

## 2025-03-10 RX ADMIN — Medication 200 GRAM(S): at 15:36

## 2025-03-10 RX ADMIN — Medication 1000 MILLILITER(S): at 14:23

## 2025-03-10 RX ADMIN — Medication 125 MILLILITER(S): at 22:22

## 2025-03-10 RX ADMIN — Medication 0.5 MILLIGRAM(S): at 16:05

## 2025-03-10 RX ADMIN — Medication 25 GRAM(S): at 22:22

## 2025-03-10 RX ADMIN — Medication 1 MILLIGRAM(S): at 16:55

## 2025-03-10 RX ADMIN — Medication 0.5 MILLIGRAM(S): at 15:35

## 2025-03-10 RX ADMIN — Medication 1000 MILLILITER(S): at 16:59

## 2025-03-10 RX ADMIN — Medication 250 MILLIGRAM(S): at 16:44

## 2025-03-10 NOTE — ED PROVIDER NOTE - PROGRESS NOTE DETAILS
Attending MD Cortez.  OB consulted and to see pt for suspected sig wound infection. Ariane:  pt signed out to me.  CT resulted.  discussed with OB team.  will admit to Dr. Meng

## 2025-03-10 NOTE — CONSULT NOTE ADULT - SUBJECTIVE AND OBJECTIVE BOX
UYEN CARMONA  29y  Female [id number]    HPI: 29y.o  POD14 s/p pLTCS for failed IOL/Cat 2 tracing presenting with low grade fevers at home and pain/ drainage from incision site. Of note, pt recent readmitted for sPEC and is s/p Mg from 3/3-3/5. Pt began experiencing pain at her incision site this morning and noticed white drainage from right side. Pt had visiting nurse at home for blood pressure management who recommended she come to the ED. Pt with chills at home, has taken her temp with highest temp recorded 100F. Pt denies chest pain, sob, lightheadedness. Endorsing some dizziness this morning. Is feeling anxious over post-partum complications.       Name of GYN Physician: WHP    ObHx: pLTCS  for failed IOL/ cat 2 c/b sPEC pp requiring Mg  GynHx: +PCOS, Denies fibroids, endometriosis, STI's, Abnormal pap smears   PMHx: Hypothyroidism   SurgHx: C/s as above  Meds: Synthroid 50mcg, Proc 30, Sertraline 25  Allergies: NKDA  Social History:  +Anxiety     Vital Signs Last 24 Hrs  T(C): 36.8 (10 Mar 2025 13:25), Max: 36.8 (10 Mar 2025 13:25)  T(F): 98.3 (10 Mar 2025 13:25), Max: 98.3 (10 Mar 2025 13:25)  HR: 95 (10 Mar 2025 13:25) (95 - 95)  BP: 115/76 (10 Mar 2025 13:25) (115/76 - 115/76)  BP(mean): --  RR: 18 (10 Mar 2025 13:25) (18 - 18)  SpO2: 99% (10 Mar 2025 13:25) (99% - 99%)    Parameters below as of 10 Mar 2025 13:25  Patient On (Oxygen Delivery Method): room air        Physical Exam:   General: Visibly anxious and tearful   CV: Extremities well perfused  Lungs: Respirating comfortablys  Abd: Soft, non-distended. Incision with surrounding erythema and 2cm defect on R side of incision with white foul-smelling discharge expressed on palpation, unable to probe defect due to patient discomfort  Ext: non-tender b/l, no edema       LABS:                              9.0    18.93 )-----------( 552      ( 10 Mar 2025 14:20 )             27.5     03-10    137  |  102  |  11  ----------------------------<  84  4.1   |  18[L]  |  0.52    Ca    9.5      10 Mar 2025 14:20    TPro  7.9  /  Alb  3.2[L]  /  TBili  0.2  /  DBili  x   /  AST  32  /  ALT  41[H]  /  AlkPhos  220[H]  03-10    I&O's Detail      Urinalysis Basic - ( 10 Mar 2025 14:20 )    Color: x / Appearance: x / SG: x / pH: x  Gluc: 84 mg/dL / Ketone: x  / Bili: x / Urobili: x   Blood: x / Protein: x / Nitrite: x   Leuk Esterase: x / RBC: x / WBC x   Sq Epi: x / Non Sq Epi: x / Bacteria: x        RADIOLOGY & ADDITIONAL STUDIES:

## 2025-03-10 NOTE — ED PROVIDER NOTE - CLINICAL SUMMARY MEDICAL DECISION MAKING FREE TEXT BOX
Attending MD Cortez.  Pt uncomfortable on arrival.  Planned pain control, blood work, CTAP given concern for extensive collection.

## 2025-03-10 NOTE — ED PROVIDER NOTE - OBJECTIVE STATEMENT
Attending MD Cortez.  Pt is a 28 yo fem  s/p  delivery of term infant on  with complication of preeclampsia.  Pt now presents to ED with complaint of new pain and significant purulent drainage from R lateral wound site.  Pt denies drainage or sig discomfort prior to today.  Pt is afebrile on arrival to ED but endorses mild dizziness/light-headedness with these sxs.  When standing wound noted to spill sig volume purulent fluid.  Site erythematous and mildly indurated with sig TTP.  No gross wound dehiscence but small area of dehiscence to right of wound c/w draining tract.  Pt otherwise well appearing.  She is nursing minimally with last expression 2-3 days ago.  Denies breast pain/induration.  Denies additional concerns.

## 2025-03-10 NOTE — ED ADULT NURSE NOTE - NSFALLUNIVINTERV_ED_ALL_ED
Bed/Stretcher in lowest position, wheels locked, appropriate side rails in place/Call bell, personal items and telephone in reach/Instruct patient to call for assistance before getting out of bed/chair/stretcher/Non-slip footwear applied when patient is off stretcher/[place] to call system/Physically safe environment - no spills, clutter or unnecessary equipment/Purposeful proactive rounding/Room/bathroom lighting operational, light cord in reach

## 2025-03-10 NOTE — H&P ADULT - HISTORY OF PRESENT ILLNESS
UYEN CARMONA  29y  Female [id number]    HPI: 29y.o  POD14 s/p pLTCS for failed IOL/Cat 2 tracing presenting with low grade fevers at home and pain/ drainage from incision site. Of note, pt recent readmitted for sPEC and is s/p Mg from 3/3-3/5. Pt began experiencing pain at her incision site this morning and noticed white drainage from right side. Pt had visiting nurse at home for blood pressure management who recommended she come to the ED. Pt with chills at home, has taken her temp with highest temp recorded 100F. Pt denies chest pain, sob, lightheadedness. Endorsing some dizziness this morning. Is feeling anxious over post-partum complications.       Name of GYN Physician: [facility]P    ObHx: pLTCS  for failed IOL/ cat 2 c/b sPEC pp requiring Mg  GynHx: +PCOS, Denies fibroids, endometriosis, STI's, Abnormal pap smears   PMHx: Hypothyroidism   SurgHx: C/s as above  Meds: Synthroid 50mcg, Proc 30, Sertraline 25  Allergies: NKDA  Social History:  +Anxiety     Vital Signs Last 24 Hrs  T(C): 36.8 (10 Mar 2025 13:25), Max: 36.8 (10 Mar 2025 13:25)  T(F): 98.3 (10 Mar 2025 13:25), Max: 98.3 (10 Mar 2025 13:25)  HR: 95 (10 Mar 2025 13:25) (95 - 95)  BP: 115/76 (10 Mar 2025 13:25) (115/76 - 115/76)  BP(mean): --  RR: 18 (10 Mar 2025 13:25) (18 - 18)  SpO2: 99% (10 Mar 2025 13:25) (99% - 99%)    Parameters below as of 10 Mar 2025 13:25  Patient On (Oxygen Delivery Method): room air

## 2025-03-10 NOTE — CONSULT NOTE ADULT - ASSESSMENT
29y.o  POD14 s/p pLTCS for failed IOL/Cat 2 tracing presenting with purulent drainage and pain at incision site. Pt stable, afebrile without tachycardia. Physical exam with defect in incision with purulent drainage concerning for infection/ possible collection. Pt without leukocytosis of anemia on labwork. Blood cultures and urine cultures pending, however low concern for systemic infection at this time. Wound cultures collected. Will obtain CTAP for further evaluation of abdomen. Will begin prophylactic abx. Unable to explore defect further due to pt discomfort. Pt to receive pain medication per ED and will reattempt when comfortable.    Recommendations:  -CTAP- pending  -Vanc/zosyn prophylactically  -IVF hydration  -F/u wound culture  -F/u blood culture/urine culture  -Will explore defect once patient comfortable  -Will continue to monitor       Discussed with Dr. Meng  Pt evaluated with Dr. Washington PGY3  Dorie Arredondo, PGY1     29y.o  POD14 s/p pLTCS for failed IOL/Cat 2 tracing presenting with purulent drainage and pain at incision site. Pt stable, afebrile without tachycardia. Physical exam with defect in incision with purulent drainage concerning for infection/ possible collection. Pt without leukocytosis of anemia on labwork. Blood cultures and urine cultures pending, however low concern for systemic infection at this time. Wound cultures collected. Will obtain CTAP for further evaluation of abdomen. Will begin prophylactic abx. Unable to explore defect further due to pt discomfort. Pt to receive pain medication per ED and will reattempt when comfortable.    Recommendations:  -CTAP- pending  -Vanc/zosyn prophylactically  -IVF hydration  -F/u wound culture  -F/u blood culture/urine culture  -Will explore defect once patient comfortable  -Will continue to monitor       Discussed with Dr. Meng  Pt evaluated with Dr. Washington PGY3  Dorie Arredondo, PGY1    Appendage 3/10@4:58p  Probed into incisional defect with sterile cotton swab. Able to probe 4-5 cm deep and 2cm laterally to the right. Copious amounts of white, foul smelling drainage evacuated with pressure. Pt awaiting CT scan. Will f/u results.    Dr. Meng aware  Pt seen with Dr. Washington PGY3   29y.o  POD14 s/p pLTCS for failed IOL/Cat 2 tracing presenting with purulent drainage and pain at incision site. Pt stable, afebrile without tachycardia. Physical exam with defect in incision with purulent drainage concerning for infection/ possible collection. Pt without leukocytosis of anemia on labwork. Blood cultures and urine cultures pending, however low concern for systemic infection at this time. Wound cultures collected. Will obtain CTAP for further evaluation of abdomen. Will begin prophylactic abx. Unable to explore defect further due to pt discomfort. Pt to receive pain medication per ED and will reattempt when comfortable.    Recommendations:  -CTAP- pending  -Vanc/zosyn prophylactically  -IVF hydration  -F/u wound culture  -F/u blood culture/urine culture  -Will explore defect once patient comfortable  -Will continue to monitor       Discussed with Dr. Meng  Pt evaluated with Dr. Washington PGY3  Dorie Arredondo, PGY1    Addendum 3/10@4:58p  Probed into incisional defect with sterile cotton swab. Able to probe 4-5 cm deep and 2cm laterally to the right. Copious amounts of white, foul smelling drainage evacuated with pressure. Pt awaiting CT scan. Will f/u results.    Dr. Meng aware  Pt seen with Dr. Washington PGY3

## 2025-03-10 NOTE — ED ADULT NURSE NOTE - OBJECTIVE STATEMENT
Patient received to intake a&ox4. Pt had  on  reporting large amount of purulent drainage coming for left lower incision site this afternoon. Pt has occurrence in ED bathroom. Drainage presents with foul odor, pink, milky in color. pt denies chest pain, sob, n+v, fever, chills. RR even, unlabored. 20g IV placed to left ac, labs collected and sent. Pending OB.

## 2025-03-10 NOTE — ED PROVIDER NOTE - PHYSICAL EXAMINATION
Mildly distended abdomen c/w recent delivery, low transverse  site erythematous with skin thickening, inc warmth, active purulent malodorous drainage from R wound site.  No santiago bloood but blood tinged purulent fluid actively draining with standing or palpation of site.  Rest of abdomen soft, non-tender.

## 2025-03-10 NOTE — H&P ADULT - ASSESSMENT
29y.o  POD14 s/p pLTCS for failed IOL/Cat 2 tracing presenting with purulent drainage and pain at incision site. Pt stable, afebrile without tachycardia. Physical exam with defect in incision with purulent drainage concerning for infection/ possible collection. Pt without leukocytosis of anemia on labwork. Blood cultures and urine cultures pending, however low concern for systemic infection at this time. Wound cultures collected. CTAP (3/10) demonstrated a 17.1x2.2x3.7cm loculated subcutaneous fluid collection along the  site, consistent with draining purulent infection visualized on exam. Patient will be admitted to the antepartum service for management of postoperative infection.     Neuro: Acetaminophen, Motrin, and Oxycodone PRN  - Continue home Sertraline   CV: Hemodynamically stable  - Continue Procardia 30XL for sPEC  - Continue to monitor BPs  Pulm: Saturating well on RA. Increase incentive spirometry.  GI: Reg diet   : Voiding Spontaneously  Heme: HSQ for DVT ppx  FEN: NS@125, replete electrolytes PRN  ID: Post-operative surgical site infection infection.  - Afebrile  - WBC: 18.93  - f/u UCx (3/10)  - f/u BCx (3/10)  - f/u Wound Cx (3/10)  - Continue empiric Zosyn/Vanc (3/10- )  Endo: Hypothyroid, continue home Synthroid  Dispo: Continue inpatient care      Margie Suggs, PGY2  d/w Dr. Meng

## 2025-03-10 NOTE — ED ADULT NURSE NOTE - BEFAST LAST WELL KNOWN
Unknown
no dermatitis, no environmental allergies, no food allergies, no immunosuppressive disorder, and no pruritus.

## 2025-03-10 NOTE — H&P ADULT - NSHPPHYSICALEXAM_GEN_ALL_CORE
Physical Exam:   General: Visibly anxious and tearful   CV: Extremities well perfused  Lungs: Respirating comfortablys  Abd: Soft, non-distended. Incision with surrounding erythema and 2cm defect on R side of incision with white foul-smelling discharge expressed on palpation, unable to probe defect due to patient discomfort  Ext: non-tender b/l, no edema

## 2025-03-10 NOTE — H&P ADULT - NSHPREVIEWOFSYSTEMS_GEN_ALL_CORE
LABS:                              9.0    18.93 )-----------( 552      ( 10 Mar 2025 14:20 )             27.5     03-10    137  |  102  |  11  ----------------------------<  84  4.1   |  18[L]  |  0.52    Ca    9.5      10 Mar 2025 14:20    TPro  7.9  /  Alb  3.2[L]  /  TBili  0.2  /  DBili  x   /  AST  32  /  ALT  41[H]  /  AlkPhos  220[H]  03-10    I&O's Detail      Urinalysis Basic - ( 10 Mar 2025 14:20 )    Color: x / Appearance: x / SG: x / pH: x  Gluc: 84 mg/dL / Ketone: x  / Bili: x / Urobili: x   Blood: x / Protein: x / Nitrite: x   Leuk Esterase: x / RBC: x / WBC x   Sq Epi: x / Non Sq Epi: x / Bacteria: x

## 2025-03-10 NOTE — H&P ADULT - NSHPLABSRESULTS_GEN_ALL_CORE
ACC: [id number] EXAM:  CT ABDOMEN AND PELVIS IC   ORDERED BY: SAHILEDDIE GOMEZ     PROCEDURE DATE:  03/10/2025          INTERPRETATION:  CLINICAL INFORMATION: Pelvic pain, purulent drainage   from surgical incision. Evaluate for post- fluid   collection/abscess at wound site    COMPARISON: None.    CONTRAST/COMPLICATIONS:  IV Contrast: Omnipaque 350  70 cc administered   30 cc discarded  Oral Contrast: NONE  .    PROCEDURE:  CT of the Abdomen and Pelvis was performed.  Sagittal and coronal reformats were performed.    FINDINGS:  LOWER CHEST: Within normal limits.    LIVER: Within normal limits.  BILE DUCTS: Normal caliber.  GALLBLADDER: Within normal limits.  SPLEEN: Within normal limits.  PANCREAS: Within normal limits.  ADRENALS: Within normal limits.  KIDNEYS/URETERS: Mild bilateral hydroureteronephrosis to the level of the   enlarged uterus.    BLADDER: Significantly distended bladder.  REPRODUCTIVE ORGANS: Enlarged, post gravid uterus with postoperative   changes of  section.    BOWEL: No bowel obstruction. Appendix is normal.  PERITONEUM/RETROPERITONEUM: No extrauterine or other intra-abdominal   collection or hematoma.  VESSELS: Within normal limits.  LYMPH NODES: No lymphadenopathy.  ABDOMINAL WALL: Lower anteriorabdominal wall loculated subcutaneous   fluid along the  section site measures approximately 17.1 x 2.2 x   3.7 cm (301:85, 601:77). Adjacent subcutaneous edema. Thickening of the   lower anterior abdominal rectus musculature likely representing small   amount of intramuscular hematoma.  BONES: Within normal limits.    IMPRESSION:  Status post  section with associated postoperative changes.  Ill-defined loculated subcutaneous fluid collection within the lower   anterior abdominal wall along the  section site.  No intra-abdominal collection or hematoma.  Thickening of the lower anterior rectus musculature, likely a small   amount of intramuscular hematoma.

## 2025-03-11 LAB
ANION GAP SERPL CALC-SCNC: 14 MMOL/L — SIGNIFICANT CHANGE UP (ref 7–14)
ANISOCYTOSIS BLD QL: SLIGHT — SIGNIFICANT CHANGE UP
BASOPHILS # BLD AUTO: 0.29 K/UL — HIGH (ref 0–0.2)
BASOPHILS NFR BLD AUTO: 1.8 % — SIGNIFICANT CHANGE UP (ref 0–2)
BLD GP AB SCN SERPL QL: NEGATIVE — SIGNIFICANT CHANGE UP
BUN SERPL-MCNC: 9 MG/DL — SIGNIFICANT CHANGE UP (ref 7–23)
CALCIUM SERPL-MCNC: 8.7 MG/DL — SIGNIFICANT CHANGE UP (ref 8.4–10.5)
CHLORIDE SERPL-SCNC: 106 MMOL/L — SIGNIFICANT CHANGE UP (ref 98–107)
CO2 SERPL-SCNC: 18 MMOL/L — LOW (ref 22–31)
CREAT SERPL-MCNC: 0.59 MG/DL — SIGNIFICANT CHANGE UP (ref 0.5–1.3)
CULTURE RESULTS: SIGNIFICANT CHANGE UP
EGFR: 123 ML/MIN/1.73M2 — SIGNIFICANT CHANGE UP
EGFR: 123 ML/MIN/1.73M2 — SIGNIFICANT CHANGE UP
EOSINOPHIL # BLD AUTO: 0.27 K/UL — SIGNIFICANT CHANGE UP (ref 0–0.5)
EOSINOPHIL NFR BLD AUTO: 1.7 % — SIGNIFICANT CHANGE UP (ref 0–6)
GIANT PLATELETS BLD QL SMEAR: PRESENT — SIGNIFICANT CHANGE UP
GLUCOSE SERPL-MCNC: 100 MG/DL — HIGH (ref 70–99)
HCT VFR BLD CALC: 28.2 % — LOW (ref 34.5–45)
HGB BLD-MCNC: 9 G/DL — LOW (ref 11.5–15.5)
IANC: 10.27 K/UL — HIGH (ref 1.8–7.4)
LYMPHOCYTES # BLD AUTO: 17.5 % — SIGNIFICANT CHANGE UP (ref 13–44)
LYMPHOCYTES # BLD AUTO: 2.8 K/UL — SIGNIFICANT CHANGE UP (ref 1–3.3)
MAGNESIUM SERPL-MCNC: 2.1 MG/DL — SIGNIFICANT CHANGE UP (ref 1.6–2.6)
MANUAL SMEAR VERIFICATION: SIGNIFICANT CHANGE UP
MCHC RBC-ENTMCNC: 28.9 PG — SIGNIFICANT CHANGE UP (ref 27–34)
MCHC RBC-ENTMCNC: 31.9 G/DL — LOW (ref 32–36)
MCV RBC AUTO: 90.7 FL — SIGNIFICANT CHANGE UP (ref 80–100)
MONOCYTES # BLD AUTO: 0.42 K/UL — SIGNIFICANT CHANGE UP (ref 0–0.9)
MONOCYTES NFR BLD AUTO: 2.6 % — SIGNIFICANT CHANGE UP (ref 2–14)
MYELOCYTES NFR BLD: 4.4 % — HIGH (ref 0–0)
NEUTROPHILS # BLD AUTO: 11.38 K/UL — HIGH (ref 1.8–7.4)
NEUTROPHILS NFR BLD AUTO: 69.3 % — SIGNIFICANT CHANGE UP (ref 43–77)
NEUTS BAND # BLD: 1.8 % — SIGNIFICANT CHANGE UP (ref 0–6)
NEUTS BAND NFR BLD: 1.8 % — SIGNIFICANT CHANGE UP (ref 0–6)
OVALOCYTES BLD QL SMEAR: SLIGHT — SIGNIFICANT CHANGE UP
PHOSPHATE SERPL-MCNC: 3.8 MG/DL — SIGNIFICANT CHANGE UP (ref 2.5–4.5)
PLAT MORPH BLD: ABNORMAL
PLATELET # BLD AUTO: 585 K/UL — HIGH (ref 150–400)
PLATELET COUNT - ESTIMATE: ABNORMAL
POIKILOCYTOSIS BLD QL AUTO: SLIGHT — SIGNIFICANT CHANGE UP
POLYCHROMASIA BLD QL SMEAR: SLIGHT — SIGNIFICANT CHANGE UP
POTASSIUM SERPL-MCNC: 3.5 MMOL/L — SIGNIFICANT CHANGE UP (ref 3.5–5.3)
POTASSIUM SERPL-SCNC: 3.5 MMOL/L — SIGNIFICANT CHANGE UP (ref 3.5–5.3)
RBC # BLD: 3.11 M/UL — LOW (ref 3.8–5.2)
RBC # FLD: 14.4 % — SIGNIFICANT CHANGE UP (ref 10.3–14.5)
RBC BLD AUTO: ABNORMAL
RH IG SCN BLD-IMP: POSITIVE — SIGNIFICANT CHANGE UP
SODIUM SERPL-SCNC: 138 MMOL/L — SIGNIFICANT CHANGE UP (ref 135–145)
SPECIMEN SOURCE: SIGNIFICANT CHANGE UP
VARIANT LYMPHS # BLD: 0.9 % — SIGNIFICANT CHANGE UP (ref 0–6)
VARIANT LYMPHS NFR BLD MANUAL: 0.9 % — SIGNIFICANT CHANGE UP (ref 0–6)
WBC # BLD: 16.01 K/UL — HIGH (ref 3.8–10.5)
WBC # FLD AUTO: 16.01 K/UL — HIGH (ref 3.8–10.5)

## 2025-03-11 RX ORDER — HYDROMORPHONE/SOD CHLOR,ISO/PF 2 MG/10 ML
1 SYRINGE (ML) INJECTION ONCE
Refills: 0 | Status: DISCONTINUED | OUTPATIENT
Start: 2025-03-11 | End: 2025-03-11

## 2025-03-11 RX ADMIN — Medication 0.5 MILLIGRAM(S): at 14:26

## 2025-03-11 RX ADMIN — Medication 975 MILLIGRAM(S): at 06:25

## 2025-03-11 RX ADMIN — Medication 25 GRAM(S): at 14:42

## 2025-03-11 RX ADMIN — Medication 25 GRAM(S): at 22:15

## 2025-03-11 RX ADMIN — HEPARIN SODIUM 5000 UNIT(S): 1000 INJECTION INTRAVENOUS; SUBCUTANEOUS at 09:42

## 2025-03-11 RX ADMIN — OXYCODONE HYDROCHLORIDE 5 MILLIGRAM(S): 30 TABLET ORAL at 20:49

## 2025-03-11 RX ADMIN — Medication 975 MILLIGRAM(S): at 19:31

## 2025-03-11 RX ADMIN — Medication 975 MILLIGRAM(S): at 13:50

## 2025-03-11 RX ADMIN — Medication 975 MILLIGRAM(S): at 01:00

## 2025-03-11 RX ADMIN — Medication 975 MILLIGRAM(S): at 12:51

## 2025-03-11 RX ADMIN — Medication 250 MILLIGRAM(S): at 20:09

## 2025-03-11 RX ADMIN — Medication 975 MILLIGRAM(S): at 00:16

## 2025-03-11 RX ADMIN — OXYCODONE HYDROCHLORIDE 5 MILLIGRAM(S): 30 TABLET ORAL at 04:42

## 2025-03-11 RX ADMIN — Medication 600 MILLIGRAM(S): at 00:17

## 2025-03-11 RX ADMIN — OXYCODONE HYDROCHLORIDE 5 MILLIGRAM(S): 30 TABLET ORAL at 05:42

## 2025-03-11 RX ADMIN — Medication 1 MILLIGRAM(S): at 02:58

## 2025-03-11 RX ADMIN — Medication 600 MILLIGRAM(S): at 09:43

## 2025-03-11 RX ADMIN — Medication 1 MILLIGRAM(S): at 02:43

## 2025-03-11 RX ADMIN — Medication 50 MICROGRAM(S): at 06:24

## 2025-03-11 RX ADMIN — SERTRALINE 25 MILLIGRAM(S): 100 TABLET, FILM COATED ORAL at 12:51

## 2025-03-11 RX ADMIN — Medication 600 MILLIGRAM(S): at 23:21

## 2025-03-11 RX ADMIN — HEPARIN SODIUM 5000 UNIT(S): 1000 INJECTION INTRAVENOUS; SUBCUTANEOUS at 22:19

## 2025-03-11 RX ADMIN — Medication 250 MILLIGRAM(S): at 04:37

## 2025-03-11 RX ADMIN — Medication 0.5 MILLIGRAM(S): at 15:20

## 2025-03-11 RX ADMIN — Medication 25 GRAM(S): at 06:24

## 2025-03-11 RX ADMIN — Medication 600 MILLIGRAM(S): at 10:25

## 2025-03-11 RX ADMIN — OXYCODONE HYDROCHLORIDE 5 MILLIGRAM(S): 30 TABLET ORAL at 21:49

## 2025-03-11 RX ADMIN — OXYCODONE HYDROCHLORIDE 5 MILLIGRAM(S): 30 TABLET ORAL at 11:57

## 2025-03-11 RX ADMIN — OXYCODONE HYDROCHLORIDE 5 MILLIGRAM(S): 30 TABLET ORAL at 12:55

## 2025-03-11 RX ADMIN — Medication 600 MILLIGRAM(S): at 22:21

## 2025-03-11 RX ADMIN — Medication 30 MILLIGRAM(S): at 06:25

## 2025-03-11 RX ADMIN — Medication 975 MILLIGRAM(S): at 07:11

## 2025-03-11 RX ADMIN — Medication 975 MILLIGRAM(S): at 18:31

## 2025-03-11 RX ADMIN — Medication 600 MILLIGRAM(S): at 01:00

## 2025-03-11 NOTE — PATIENT PROFILE ADULT - HAVE YOU RECENTLY LOST WEIGHT WITHOUT TRYING?
No (0) [Normal] : normal rate, regular rhythm, normal S1 and S2 and no murmur heard [de-identified] : left shoulder pain w rotation. dec rom due to pain and tightness

## 2025-03-11 NOTE — ADVANCED PRACTICE NURSE CONSULT - RECOMMEDATIONS
Topical Recommendations    Transverse Midline Abdomen: NPWT/VAC managed by WOCN service line M, W, F. If bright santiago red sanguinous drainage noted continuously draining in canister, please turn off machine, LEAVE DRESSING IN PLACE, contact OBGYN resident to bedside. If dressing compromised >2 hours, turn off machine, remove dressing and replace with alternative: Cleanse with NS, pat dry. Apply Liquid barrier film to periwound skin (allow to dry). Pack wound with hydrofiber ribbon (aquacel ribbon) leaving 2" exposed for retrieval. Secure with silicone foam with borders, change daily and PRN if soiled.    Plan of care discussed with patient, all questions answered.    Please contact Wound Care Service Line if we can be of further assistance (ext [phone]).  NPWT/VAC applied on Tuesday, 3/11/25. If no complications noted and no abnormal drainage collection in canister, next dressing change to be done FRIDAY 3/14/25 in order to re-establish M, W, F schedule.    Topical Recommendations    Transverse Midline Abdomen: NPWT/VAC managed by WOCN service line M, W, F. If bright santiago red sanguinous drainage noted continuously draining in canister, please turn off machine, LEAVE DRESSING IN PLACE, contact OBGYN resident to bedside. If dressing compromised >2 hours, turn off machine, remove dressing and replace with alternative: Cleanse with NS, pat dry. Apply Liquid barrier film to periwound skin (allow to dry). Pack wound with hydrofiber ribbon (aquacel ribbon) leaving 2" exposed for retrieval. Secure with silicone foam with borders, change daily and PRN if soiled.    Plan of care discussed with patient, all questions answered.    Please contact Wound Care Service Line if we can be of further assistance (ext [phone]).

## 2025-03-11 NOTE — PATIENT PROFILE ADULT - FUNCTIONAL ASSESSMENT - BASIC MOBILITY 6.
4-calculated by average/Not able to assess (calculate score using [facility] averaging method)

## 2025-03-11 NOTE — PROGRESS NOTE ADULT - ATTENDING COMMENTS
Patient seen and examined this am.  Noted to have dressing In place in wet to dry fashion. Awaiting wound consult for placement of wound vac,  Drainage still noted upon exam    Plan of care reviewed with patient.  WIll alert discharge planning to arrange for home health care

## 2025-03-12 LAB — VANCOMYCIN TROUGH SERPL-MCNC: 10.1 UG/ML — SIGNIFICANT CHANGE UP (ref 10–20)

## 2025-03-12 RX ADMIN — Medication 975 MILLIGRAM(S): at 02:58

## 2025-03-12 RX ADMIN — Medication 975 MILLIGRAM(S): at 17:45

## 2025-03-12 RX ADMIN — Medication 30 MILLIGRAM(S): at 06:40

## 2025-03-12 RX ADMIN — Medication 600 MILLIGRAM(S): at 06:39

## 2025-03-12 RX ADMIN — OXYCODONE HYDROCHLORIDE 5 MILLIGRAM(S): 30 TABLET ORAL at 09:30

## 2025-03-12 RX ADMIN — SERTRALINE 25 MILLIGRAM(S): 100 TABLET, FILM COATED ORAL at 13:00

## 2025-03-12 RX ADMIN — OXYCODONE HYDROCHLORIDE 5 MILLIGRAM(S): 30 TABLET ORAL at 20:05

## 2025-03-12 RX ADMIN — HEPARIN SODIUM 5000 UNIT(S): 1000 INJECTION INTRAVENOUS; SUBCUTANEOUS at 10:16

## 2025-03-12 RX ADMIN — Medication 600 MILLIGRAM(S): at 13:00

## 2025-03-12 RX ADMIN — Medication 975 MILLIGRAM(S): at 16:47

## 2025-03-12 RX ADMIN — Medication 25 GRAM(S): at 22:39

## 2025-03-12 RX ADMIN — Medication 250 MILLIGRAM(S): at 21:33

## 2025-03-12 RX ADMIN — Medication 600 MILLIGRAM(S): at 07:30

## 2025-03-12 RX ADMIN — OXYCODONE HYDROCHLORIDE 5 MILLIGRAM(S): 30 TABLET ORAL at 20:45

## 2025-03-12 RX ADMIN — OXYCODONE HYDROCHLORIDE 5 MILLIGRAM(S): 30 TABLET ORAL at 08:32

## 2025-03-12 RX ADMIN — Medication 50 MICROGRAM(S): at 06:39

## 2025-03-12 RX ADMIN — Medication 25 GRAM(S): at 06:40

## 2025-03-12 RX ADMIN — HEPARIN SODIUM 5000 UNIT(S): 1000 INJECTION INTRAVENOUS; SUBCUTANEOUS at 21:33

## 2025-03-12 RX ADMIN — Medication 250 MILLIGRAM(S): at 10:48

## 2025-03-12 RX ADMIN — Medication 600 MILLIGRAM(S): at 13:55

## 2025-03-12 RX ADMIN — Medication 25 GRAM(S): at 14:14

## 2025-03-12 RX ADMIN — Medication 975 MILLIGRAM(S): at 01:58

## 2025-03-12 NOTE — PROGRESS NOTE ADULT - ATTENDING COMMENTS
patient is doing welling   wound vac in place   wound care to be set up outpatient   social work following   continue excellent post partum care

## 2025-03-13 LAB
-  CLINDAMYCIN: SIGNIFICANT CHANGE UP
-  ERYTHROMYCIN: SIGNIFICANT CHANGE UP
-  GENTAMICIN: SIGNIFICANT CHANGE UP
-  OXACILLIN: SIGNIFICANT CHANGE UP
-  PENICILLIN: SIGNIFICANT CHANGE UP
-  RIFAMPIN: SIGNIFICANT CHANGE UP
-  TETRACYCLINE: SIGNIFICANT CHANGE UP
-  TRIMETHOPRIM/SULFAMETHOXAZOLE: SIGNIFICANT CHANGE UP
-  VANCOMYCIN: SIGNIFICANT CHANGE UP
METHOD TYPE: SIGNIFICANT CHANGE UP

## 2025-03-13 PROCEDURE — 99223 1ST HOSP IP/OBS HIGH 75: CPT

## 2025-03-13 RX ORDER — SERTRALINE 100 MG/1
50 TABLET, FILM COATED ORAL DAILY
Refills: 0 | Status: DISCONTINUED | OUTPATIENT
Start: 2025-03-13 | End: 2025-03-14

## 2025-03-13 RX ADMIN — Medication 975 MILLIGRAM(S): at 18:42

## 2025-03-13 RX ADMIN — Medication 1 MILLIGRAM(S): at 17:25

## 2025-03-13 RX ADMIN — OXYCODONE HYDROCHLORIDE 5 MILLIGRAM(S): 30 TABLET ORAL at 11:31

## 2025-03-13 RX ADMIN — OXYCODONE HYDROCHLORIDE 5 MILLIGRAM(S): 30 TABLET ORAL at 00:33

## 2025-03-13 RX ADMIN — Medication 975 MILLIGRAM(S): at 12:01

## 2025-03-13 RX ADMIN — Medication 975 MILLIGRAM(S): at 23:13

## 2025-03-13 RX ADMIN — Medication 600 MILLIGRAM(S): at 18:00

## 2025-03-13 RX ADMIN — Medication 600 MILLIGRAM(S): at 11:32

## 2025-03-13 RX ADMIN — Medication 975 MILLIGRAM(S): at 11:31

## 2025-03-13 RX ADMIN — Medication 25 GRAM(S): at 14:05

## 2025-03-13 RX ADMIN — OXYCODONE HYDROCHLORIDE 5 MILLIGRAM(S): 30 TABLET ORAL at 06:03

## 2025-03-13 RX ADMIN — Medication 600 MILLIGRAM(S): at 23:13

## 2025-03-13 RX ADMIN — Medication 250 MILLIGRAM(S): at 10:28

## 2025-03-13 RX ADMIN — OXYCODONE HYDROCHLORIDE 5 MILLIGRAM(S): 30 TABLET ORAL at 06:35

## 2025-03-13 RX ADMIN — Medication 600 MILLIGRAM(S): at 00:01

## 2025-03-13 RX ADMIN — Medication 50 MICROGRAM(S): at 06:03

## 2025-03-13 RX ADMIN — Medication 25 GRAM(S): at 06:03

## 2025-03-13 RX ADMIN — HEPARIN SODIUM 5000 UNIT(S): 1000 INJECTION INTRAVENOUS; SUBCUTANEOUS at 11:11

## 2025-03-13 RX ADMIN — Medication 25 GRAM(S): at 23:07

## 2025-03-13 RX ADMIN — Medication 600 MILLIGRAM(S): at 00:32

## 2025-03-13 RX ADMIN — SERTRALINE 25 MILLIGRAM(S): 100 TABLET, FILM COATED ORAL at 11:32

## 2025-03-13 RX ADMIN — Medication 975 MILLIGRAM(S): at 17:35

## 2025-03-13 RX ADMIN — OXYCODONE HYDROCHLORIDE 5 MILLIGRAM(S): 30 TABLET ORAL at 21:21

## 2025-03-13 RX ADMIN — OXYCODONE HYDROCHLORIDE 5 MILLIGRAM(S): 30 TABLET ORAL at 12:01

## 2025-03-13 RX ADMIN — Medication 600 MILLIGRAM(S): at 17:35

## 2025-03-13 RX ADMIN — Medication 250 MILLIGRAM(S): at 21:24

## 2025-03-13 RX ADMIN — HEPARIN SODIUM 5000 UNIT(S): 1000 INJECTION INTRAVENOUS; SUBCUTANEOUS at 23:09

## 2025-03-13 RX ADMIN — OXYCODONE HYDROCHLORIDE 5 MILLIGRAM(S): 30 TABLET ORAL at 01:30

## 2025-03-13 RX ADMIN — OXYCODONE HYDROCHLORIDE 5 MILLIGRAM(S): 30 TABLET ORAL at 20:50

## 2025-03-13 RX ADMIN — Medication 30 MILLIGRAM(S): at 06:02

## 2025-03-13 RX ADMIN — Medication 600 MILLIGRAM(S): at 12:02

## 2025-03-13 NOTE — BH CONSULTATION LIAISON ASSESSMENT NOTE - NSBHCONSULTFOLLOWAFTERCARE_PSY_A_CORE FT
[facility]  [phone]    [facility]  [phone] [place] [address], First Floor, [place] [address]  [phone]  [email or web address]: [phone]

## 2025-03-13 NOTE — BH CONSULTATION LIAISON ASSESSMENT NOTE - DESCRIPTION
Domiciled at home in [place] with  and  baby.  for 3 yrs, pt was born and raised in [place],  [place] in [place]. Employed as a health navigator.

## 2025-03-13 NOTE — BH CONSULTATION LIAISON ASSESSMENT NOTE - PRIOR MEDICATION SIDE EFFECTS OR ADVERSE REACTIONS
None known
Detail Level: Detailed
Pt reports worse on left side than right\\nPt reports using both medications daily\\nReports “stopped working” a few months ago \\nDisc continuing akleif in PM and adding wynlevi in AM\\nDisc wynlevi r/b/sed\\nPt reports acne got worse after stopping birth control\\nDisc spironolactone r/b/sed\\nPt opted to try topical and oral \\nPt opted to keep topicals the same, and add spironolactone \\nDisc pt can call to add wynlevi
Pt reports has hx of cysts \\nDisc ilk inj r/b/sed

## 2025-03-13 NOTE — BH CONSULTATION LIAISON ASSESSMENT NOTE - RISK ASSESSMENT
risk: recent delivery, anxiety, stressful family dynamics  protective:  no active SI or HI, no hx of SA, lives with family, reason for living, Adventist, no substance abuse

## 2025-03-13 NOTE — CHART NOTE - NSCHARTNOTEFT_GEN_A_CORE
28yo F, s/p C/S 2/24/25 s/b PEC w/SF, admitted with wound infection.     Per MD Najera, patient contact [facility] office with concerns indicating she was having issues with her family at home, crying and expressing being very emotional.   Per MD Najera, she had recently started patient on antidepressant medications when she saw her at the office but the medications do take time to take effect.  MD Najera requesting patient be seen/evaluated by Psych prior to hospital discharge.       T(C): 36.7 (03-13-25 @ 06:00), Max: 36.9 (03-12-25 @ 22:32)  T(F): 98 (03-13-25 @ 06:00), Max: 98.4 (03-12-25 @ 22:32)  HR: 72 (03-13-25 @ 06:00) (72 - 93)  BP: 133/79 (03-13-25 @ 06:00) (113/64 - 133/79)  RR: 17 (03-13-25 @ 06:00) (15 - 17)  SpO2: 100% (03-13-25 @ 06:00) (99% - 100%)  Wt(kg): --  PLAN        PLAN    1. Psych consult--Request submitted thru [facility]    2. contacted psych dept with request (ext [phone]), information of request provided, anticipate patient being seen/evaluated today by Psych.    3. continue to monitor        Informed MD Moss (resident)    Informed MD Rancho Cody
Patient seen at bedside with Dr. Vickers for wound drainage. Patient given Morphine 1 mg per her request. Lidocaine 1% w/o epi 10 cc given subcutaneously.  Right corner of Pfannenstiel incision already draining (culture previously sent from ED). With gentle pressure, copious purulent/sanguinous fluid drained from incisional defect. With continued pressure, multiple small areas of the R-side of incision opened and drained copious fluid.  Incision probed with cotton-tipped swab and fascia found to be intact. Able to probe to almost L-most angle of incision, so decision made to open the skin of the incision further to allow for continued drainage.  Saline irrigation performed.  Incision packed with 0.5 inch plain packing, wet-to-dry.  Abd pad placed.  Patient tolerated procedure well.  Wound care consult placed.    D/w Dr. Rancho Craft PGY3
RN notified provider that patient would like to discuss results of culture.     Provider explained presence of bacteria found in culture and that we are awaiting sensitivities. Pt endorsed feeling overwhelmed due to having  at home and having this infection. Notes she was trying to contact [facility] but has been unsuccessful. SW consult placed.

## 2025-03-13 NOTE — ADVANCED PRACTICE NURSE CONSULT - RECOMMEDATIONS
Topical recommendations:   ---Transverse abdomen: Cleanse with NS, pat dry. Apply Liquid barrier film to periwound skin (allow to dry). Pack wound with hydrofiber ribbon (aquacel ribbon) leaving 2" exposed for retrieval. Secure with silicone foam with borders, change every other day and PRN if soiled.    Plan discussed with patient, patient's spouse, PA, and Dr. Vickers at bedside.   Please contact Wound/Ostomy Care Service Line if we can be of further assistance (ext [phone]). Please reconsult if changes to tissue type noted.

## 2025-03-13 NOTE — BH CONSULTATION LIAISON ASSESSMENT NOTE - CURRENT MEDICATION
MEDICATIONS  (STANDING):  acetaminophen     Tablet .. 975 milliGRAM(s) Oral every 6 hours  heparin   Injectable 5000 Unit(s) SubCutaneous every 12 hours  ibuprofen  Tablet. 600 milliGRAM(s) Oral every 6 hours  levothyroxine 50 MICROGram(s) Oral daily  NIFEdipine XL 30 milliGRAM(s) Oral daily  piperacillin/tazobactam IVPB.. 3.375 Gram(s) IV Intermittent every 8 hours  sertraline 25 milliGRAM(s) Oral daily  vancomycin  IVPB 1000 milliGRAM(s) IV Intermittent every 12 hours    MEDICATIONS  (PRN):  HYDROmorphone  Injectable 0.5 milliGRAM(s) IV Push every 2 hours PRN Pain  lanolin Ointment 1 Application(s) Topical every 6 hours PRN Sore Nipples  oxyCODONE    IR 5 milliGRAM(s) Oral every 4 hours PRN Severe Pain (7 - 10)  simethicone 80 milliGRAM(s) Chew every 4 hours PRN Gas

## 2025-03-13 NOTE — BH CONSULTATION LIAISON ASSESSMENT NOTE - SUMMARY
used Patient is a 29 year old female  gave birth via  on , no formal PPHx but reports increased anxiety during pregnancy, readmitted to the hospital for treatment of operative wound infection, psychiatry consulted for ongoing depressive symptoms. Given timeline of pt sxs likely with  anxiety disorder, current mood appears to be more low given stressful dynamics at home. Pt reporting some sxs of MDD including passive suicidality, low mood, however denies anhedonia and does not display any neurovegetative sxs of depression. Clinically appears more anxious and notes she spends a lot of time worrying about how she will move forward with her  refusing to speak with her family, however pt conceptualizes her sxs as being more depressed. Started on sertraline 25 mg qD by OB team, tolerating well.    PLAN  - defer obs status to primary team, no psychiatric indication for 1:1 observation  - INCREASE sertraline to 50 mg qD for treatment of anxiety and depression  - can use ativan 0.5 mg q6h PRN severe anxiety while in hospital setting  - pt amenable to following up outpatient with psychiatry, will contact [facility] to assess feasibility  - c/w with outpatient therapy at [facility]  - [facility] [phone] (M-F 9am-7pm)   - [facility] [phone]

## 2025-03-13 NOTE — BH CONSULTATION LIAISON ASSESSMENT NOTE - NSSUICPROTFACT_PSY_ALL_CORE
Responsibility to children, family, or others/Identifies reasons for living/Supportive social network of family or friends/Fear of death or the actual act of killing self/Cultural, spiritual and/or moral attitudes against suicide/Engaged in work or school/Positive therapeutic relationships/Confucianist beliefs

## 2025-03-13 NOTE — BH CONSULTATION LIAISON ASSESSMENT NOTE - NSBHCHARTREVIEWVS_PSY_A_CORE FT
Vital Signs Last 24 Hrs  T(C): 36.6 (13 Mar 2025 10:30), Max: 36.9 (12 Mar 2025 22:32)  T(F): 97.8 (13 Mar 2025 10:30), Max: 98.4 (12 Mar 2025 22:32)  HR: 81 (13 Mar 2025 10:30) (72 - 93)  BP: 121/73 (13 Mar 2025 10:30) (113/64 - 133/79)  BP(mean): --  RR: 16 (13 Mar 2025 10:30) (15 - 17)  SpO2: 98% (13 Mar 2025 10:30) (98% - 100%)    Parameters below as of 13 Mar 2025 10:30  Patient On (Oxygen Delivery Method): room air

## 2025-03-13 NOTE — BH CONSULTATION LIAISON ASSESSMENT NOTE - NSBHATTESTCOMMENTATTENDFT_PSY_A_CORE
Chart reviewed. Seen with resident.  present for part of the eval. Presented as AA O x 3, anxious but in control. Without severe distress or overt safety concerns, but citing various environmental stressors as above. Agreeable to care as above. Agree with above assessment/recs. Will continue to follow though there are no psych barrier to discharge when med cleared.
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
## 2025-03-13 NOTE — BH CONSULTATION LIAISON ASSESSMENT NOTE - NSBHMSEBODY_PSY_A_CORE
Patient called the [facility] this morning to report that she left the rehab and is now at home. Patient stated that she wasn't given her meds right and she wasn't assisted on the bed  pan in a timely fashion and soiled herself. She was very worked up and wanted to inquire  about other facilities that she can possible go to. Patient was very disappointed when the  [facility] informed her that she doesn't handle this type of situation.  informed the patient  that she would contact the [facility] and have her to reach out to the patient. Patient agreed  to the plan.     Plan of Care  [facility] will get information from [facility] regarding plan for patient Average build

## 2025-03-14 ENCOUNTER — TRANSCRIPTION ENCOUNTER (OUTPATIENT)
Age: 30
End: 2025-03-14

## 2025-03-14 VITALS
HEART RATE: 83 BPM | TEMPERATURE: 98 F | DIASTOLIC BLOOD PRESSURE: 81 MMHG | OXYGEN SATURATION: 98 % | RESPIRATION RATE: 18 BRPM | SYSTOLIC BLOOD PRESSURE: 136 MMHG

## 2025-03-14 RX ORDER — LEVOTHYROXINE SODIUM 300 MCG
1 TABLET ORAL
Qty: 0 | Refills: 0 | DISCHARGE
Start: 2025-03-14

## 2025-03-14 RX ORDER — SERTRALINE 100 MG/1
1 TABLET, FILM COATED ORAL
Qty: 30 | Refills: 0
Start: 2025-03-14 | End: 2025-04-12

## 2025-03-14 RX ORDER — AMOXICILLIN AND CLAVULANATE POTASSIUM 500; 125 MG/1; MG/1
1 TABLET, FILM COATED ORAL
Qty: 0 | Refills: 0 | DISCHARGE
Start: 2025-03-14

## 2025-03-14 RX ORDER — SERTRALINE 100 MG/1
1 TABLET, FILM COATED ORAL
Qty: 0 | Refills: 0 | DISCHARGE
Start: 2025-03-14

## 2025-03-14 RX ORDER — OXYCODONE HYDROCHLORIDE 30 MG/1
1 TABLET ORAL
Qty: 7 | Refills: 0
Start: 2025-03-14

## 2025-03-14 RX ORDER — AMOXICILLIN AND CLAVULANATE POTASSIUM 500; 125 MG/1; MG/1
1 TABLET, FILM COATED ORAL EVERY 12 HOURS
Refills: 0 | Status: DISCONTINUED | OUTPATIENT
Start: 2025-03-14 | End: 2025-03-14

## 2025-03-14 RX ORDER — AMOXICILLIN AND CLAVULANATE POTASSIUM 500; 125 MG/1; MG/1
1 TABLET, FILM COATED ORAL
Qty: 20 | Refills: 0
Start: 2025-03-14 | End: 2025-03-23

## 2025-03-14 RX ADMIN — Medication 600 MILLIGRAM(S): at 06:06

## 2025-03-14 RX ADMIN — Medication 975 MILLIGRAM(S): at 05:55

## 2025-03-14 RX ADMIN — Medication 25 GRAM(S): at 05:54

## 2025-03-14 RX ADMIN — Medication 600 MILLIGRAM(S): at 00:21

## 2025-03-14 RX ADMIN — Medication 30 MILLIGRAM(S): at 05:55

## 2025-03-14 RX ADMIN — Medication 975 MILLIGRAM(S): at 11:04

## 2025-03-14 RX ADMIN — OXYCODONE HYDROCHLORIDE 5 MILLIGRAM(S): 30 TABLET ORAL at 13:21

## 2025-03-14 RX ADMIN — Medication 975 MILLIGRAM(S): at 00:21

## 2025-03-14 RX ADMIN — Medication 50 MICROGRAM(S): at 05:55

## 2025-03-14 RX ADMIN — HEPARIN SODIUM 5000 UNIT(S): 1000 INJECTION INTRAVENOUS; SUBCUTANEOUS at 11:04

## 2025-03-14 RX ADMIN — OXYCODONE HYDROCHLORIDE 5 MILLIGRAM(S): 30 TABLET ORAL at 03:07

## 2025-03-14 RX ADMIN — SERTRALINE 50 MILLIGRAM(S): 100 TABLET, FILM COATED ORAL at 11:03

## 2025-03-14 RX ADMIN — Medication 975 MILLIGRAM(S): at 06:07

## 2025-03-14 RX ADMIN — Medication 600 MILLIGRAM(S): at 11:03

## 2025-03-14 RX ADMIN — OXYCODONE HYDROCHLORIDE 5 MILLIGRAM(S): 30 TABLET ORAL at 02:07

## 2025-03-14 RX ADMIN — Medication 600 MILLIGRAM(S): at 05:55

## 2025-03-14 NOTE — PROGRESS NOTE ADULT - SUBJECTIVE AND OBJECTIVE BOX
Postpartum Readmit    Subjective:   Pt seen and examined at bedside. No events overnight. She denies fever, chills, chest pain, shortness of breath, palpitations, nausea, vomiting.    Objective:  T(F): 97.8 (03-13-25 @ 10:30), Max: 98.4 (03-12-25 @ 22:32)  HR: 81 (03-13-25 @ 10:30) (72 - 93)  BP: 121/73 (03-13-25 @ 10:30) (113/64 - 133/79)  RR: 16 (03-13-25 @ 10:30) (15 - 17)  SpO2: 98% (03-13-25 @ 10:30) (98% - 100%)  Wt(kg): --  I&O's Summary    12 Mar 2025 07:01  -  13 Mar 2025 07:00  --------------------------------------------------------  IN: 0 mL / OUT: 1650 mL / NET: -1650 mL      CAPILLARY BLOOD GLUCOSE          MEDICATIONS  (STANDING):  acetaminophen     Tablet .. 975 milliGRAM(s) Oral every 6 hours  heparin   Injectable 5000 Unit(s) SubCutaneous every 12 hours  ibuprofen  Tablet. 600 milliGRAM(s) Oral every 6 hours  levothyroxine 50 MICROGram(s) Oral daily  NIFEdipine XL 30 milliGRAM(s) Oral daily  piperacillin/tazobactam IVPB.. 3.375 Gram(s) IV Intermittent every 8 hours  sertraline 25 milliGRAM(s) Oral daily  vancomycin  IVPB 1000 milliGRAM(s) IV Intermittent every 12 hours    MEDICATIONS  (PRN):  HYDROmorphone  Injectable 0.5 milliGRAM(s) IV Push every 2 hours PRN Pain  lanolin Ointment 1 Application(s) Topical every 6 hours PRN Sore Nipples  oxyCODONE    IR 5 milliGRAM(s) Oral every 4 hours PRN Severe Pain (7 - 10)  simethicone 80 milliGRAM(s) Chew every 4 hours PRN Gas      Physical Exam:  Constitutional: NAD, A+O x3  CV: RRR  Lungs: clear to auscultation bilaterally  Abdomen: soft, nondistended, no guarding, no rebound, normal bowel sounds  Incision: clean, dry, intact  Extremities: no lower extremity edema or calf tenderness bilaterally; venodynes in place    LABS:               9.0    16.01 )-----------( 585      ( 03-11 @ 05:58 )             28.2                9.0    18.93 )-----------( 552      ( 03-10 @ 14:20 )             27.5                9.7    22.28 )-----------( 441      ( 03-05 @ 13:26 )             30.3       
Postpartum Readmit    Subjective:   Pt seen and examined at bedside. Patient had an episode of diarrhea yesterday. Pain well controlled. She denies fever, chills, chest pain, shortness of breath, palpitations, nausea, vomiting.     Objective:  T(F): 97.6 (03-12-25 @ 05:35), Max: 98.7 (03-11-25 @ 14:02)  HR: 85 (03-12-25 @ 05:35) (85 - 102)  BP: 112/65 (03-12-25 @ 05:35) (112/65 - 124/64)  RR: 17 (03-12-25 @ 05:35) (16 - 18)  SpO2: 100% (03-12-25 @ 05:35) (98% - 100%)  Wt(kg): --  I&O's Summary    11 Mar 2025 07:01  -  12 Mar 2025 07:00  --------------------------------------------------------  IN: 0 mL / OUT: 700 mL / NET: -700 mL        MEDICATIONS  (STANDING):  acetaminophen     Tablet .. 975 milliGRAM(s) Oral every 6 hours  heparin   Injectable 5000 Unit(s) SubCutaneous every 12 hours  ibuprofen  Tablet. 600 milliGRAM(s) Oral every 6 hours  levothyroxine 50 MICROGram(s) Oral daily  NIFEdipine XL 30 milliGRAM(s) Oral daily  piperacillin/tazobactam IVPB.. 3.375 Gram(s) IV Intermittent every 8 hours  sertraline 25 milliGRAM(s) Oral daily  vancomycin  IVPB 1000 milliGRAM(s) IV Intermittent every 12 hours    MEDICATIONS  (PRN):  HYDROmorphone  Injectable 0.5 milliGRAM(s) IV Push every 2 hours PRN Pain  lanolin Ointment 1 Application(s) Topical every 6 hours PRN Sore Nipples  oxyCODONE    IR 5 milliGRAM(s) Oral every 4 hours PRN Severe Pain (7 - 10)  simethicone 80 milliGRAM(s) Chew every 4 hours PRN Gas      Physical Exam:  Constitutional: NAD, A+O x3  Abdomen: soft, nondistended, no guarding, no rebound, normal bowel sounds  Incision: wound vac in place draining minimal serosanguinous fluid  Extremities: no lower extremity edema or calf tenderness bilaterally; venodynes in place        LABS:    03-11    138    |  106    |  9      ----------------------------<  100[H]  3.5     |  18[L]  |  0.59     Ca    8.7        11 Mar 2025 05:58  Phos  3.8       03-11  Mg     2.10      03-11            Urinalysis Basic - ( 11 Mar 2025 05:58 )    Color: x / Appearance: x / SG: x / pH: x  Gluc: 100 mg/dL / Ketone: x  / Bili: x / Urobili: x   Blood: x / Protein: x / Nitrite: x   Leuk Esterase: x / RBC: x / WBC x   Sq Epi: x / Non Sq Epi: x / Bacteria: x    
Postpartum Readmit    Subjective:   Pt seen and examined at bedside. No events overnight. She denies fever, chills, chest pain, shortness of breath, palpitations, nausea, vomiting.    Objective:  T(F): 98 (03-14-25 @ 10:21), Max: 98.4 (03-13-25 @ 14:27)  HR: 83 (03-14-25 @ 10:21) (69 - 99)  BP: 136/81 (03-14-25 @ 10:21) (117/69 - 136/81)  RR: 18 (03-14-25 @ 10:21) (16 - 18)  SpO2: 98% (03-14-25 @ 10:21) (98% - 100%)  Wt(kg): --  I&O's Summary        MEDICATIONS  (STANDING):  acetaminophen     Tablet .. 975 milliGRAM(s) Oral every 6 hours  amoxicillin  875 milliGRAM(s)/clavulanate 1 Tablet(s) Oral every 12 hours  heparin   Injectable 5000 Unit(s) SubCutaneous every 12 hours  ibuprofen  Tablet. 600 milliGRAM(s) Oral every 6 hours  levothyroxine 50 MICROGram(s) Oral daily  NIFEdipine XL 30 milliGRAM(s) Oral daily  sertraline 50 milliGRAM(s) Oral daily    MEDICATIONS  (PRN):  HYDROmorphone  Injectable 0.5 milliGRAM(s) IV Push every 2 hours PRN Pain  lanolin Ointment 1 Application(s) Topical every 6 hours PRN Sore Nipples  oxyCODONE    IR 5 milliGRAM(s) Oral every 4 hours PRN Severe Pain (7 - 10)  simethicone 80 milliGRAM(s) Chew every 4 hours PRN Gas      Physical Exam:  Constitutional: NAD, A+O x3  Abdomen: soft, nondistended, no guarding, no rebound,   Incision: clean, dry, intact with dressing in palce  Extremities: no lower extremity edema or calf tenderness bilaterally    LABS               9.0    16.01 )-----------( 585      ( 03-11 @ 05:58 )             28.2                9.0    18.93 )-----------( 552      ( 03-10 @ 14:20 )             27.5                9.7    22.28 )-----------( 441      ( 03-05 @ 13:26 )             30.3     
Post-Operative Note, C/S    PP readmir    Subjective:  The patient feels well.  She is ambulating.   She is tolerating regular diet.  She denies nausea and vomiting; denies fever.  She is voiding.  Her pain is controlled; incisional pain is appropriate.        Physical exam:    Vital Signs Last 24 Hrs  T(C): 36.7 (14 Mar 2025 10:21), Max: 36.9 (13 Mar 2025 14:27)  T(F): 98 (14 Mar 2025 10:21), Max: 98.4 (13 Mar 2025 14:27)  HR: 83 (14 Mar 2025 10:21) (69 - 99)  BP: 136/81 (14 Mar 2025 10:21) (117/69 - 136/81)  BP(mean): --  RR: 18 (14 Mar 2025 10:21) (16 - 18)  SpO2: 98% (14 Mar 2025 10:21) (98% - 100%)    Parameters below as of 14 Mar 2025 10:21  Patient On (Oxygen Delivery Method): room air        Gen: NAD  Abdomen: Soft, nontender, no distension , firm uterine fundus at umbilicus.  Incision: Dressing in place.     LABS:      Rubella status:     Allergies    No Known Allergies    Intolerances      MEDICATIONS  (STANDING):  acetaminophen     Tablet .. 975 milliGRAM(s) Oral every 6 hours  amoxicillin  875 milliGRAM(s)/clavulanate 1 Tablet(s) Oral every 12 hours  heparin   Injectable 5000 Unit(s) SubCutaneous every 12 hours  ibuprofen  Tablet. 600 milliGRAM(s) Oral every 6 hours  levothyroxine 50 MICROGram(s) Oral daily  NIFEdipine XL 30 milliGRAM(s) Oral daily  sertraline 50 milliGRAM(s) Oral daily    MEDICATIONS  (PRN):  HYDROmorphone  Injectable 0.5 milliGRAM(s) IV Push every 2 hours PRN Pain  lanolin Ointment 1 Application(s) Topical every 6 hours PRN Sore Nipples  oxyCODONE    IR 5 milliGRAM(s) Oral every 4 hours PRN Severe Pain (7 - 10)  simethicone 80 milliGRAM(s) Chew every 4 hours PRN Gas            29y.o  POD18 s/p pLTCS for failed IOL/Cat 2 tracing c/b PP sPEC, now admitted for wound infection. CTAP (3/10) demonstrated a 17.1x2.2x3.7cm loculated subcutaneous fluid collection along the  site. Wound drained at bedside on 3/11. Patient is clinically stable, pain improving, remains afebrile    #Wound infection  - Augmentin (3/14-)  - Vanc/Zosyn (3/10-)  - s/p bedside drainage 3/10  - UCx (3/10): contaminated  - BCx (3/10): NGTD, f/u final  - f/u wound culture final (3/10): staph aureus, E faecalis  - Wound care:hydrofiber and foam dressing.   - Will need to be changed every other day. Has home health aid coming 3 x a week, and will be seen at [facility] once weekly.     #sPEC  - Continue Procardia 30 daily  - Has BP logs  - Reviewed PEC precautions with pt    #Postpartum  - Regular diet  - PO Tylenol/Motrin  - HSQ for DVT ppx  - Continue home Synthroid  - Psych: Zoloft 50  - Follow up outpatient psych: Plans to see [facility] in [place]  - Pt also establishing care with [facility].     D/C home today  Follow up at [facility] 2-3 days for assessment  Plan reviewed with Dr. Loya.       
Postpartum readmit    Subjective:   Pt seen and examined at bedside. Patient reports pain is improving after bedside drainage of wound. She denies fever, chills, chest pain, shortness of breath, palpitations, nausea, vomiting.     Objective:  T(F): 97.7 (25 @ 06:29), Max: 99.9 (03-10-25 @ 22:19)  HR: 92 (25 @ 06:29) (87 - 107)  BP: 131/65 (25 @ 06:29) (102/60 - 131/65)  RR: 18 (25 @ 06:29) (16 - 18)  SpO2: 100% (25 @ 06:29) (97% - 100%)  Wt(kg): --  I&O's Summary    10 Mar 2025 07:01  -  11 Mar 2025 07:00  --------------------------------------------------------  IN: 625 mL / OUT: 0 mL / NET: 625 mL      CAPILLARY BLOOD GLUCOSE      POCT Blood Glucose.: 95 mg/dL (10 Mar 2025 13:29)      MEDICATIONS  (STANDING):  acetaminophen     Tablet .. 975 milliGRAM(s) Oral every 6 hours  heparin   Injectable 5000 Unit(s) SubCutaneous every 12 hours  ibuprofen  Tablet. 600 milliGRAM(s) Oral every 6 hours  levothyroxine 50 MICROGram(s) Oral daily  NIFEdipine XL 30 milliGRAM(s) Oral daily  piperacillin/tazobactam IVPB.. 3.375 Gram(s) IV Intermittent every 8 hours  sertraline 25 milliGRAM(s) Oral daily  sodium chloride 0.9%. 1000 milliLiter(s) (125 mL/Hr) IV Continuous <Continuous>  vancomycin  IVPB 1000 milliGRAM(s) IV Intermittent every 12 hours    MEDICATIONS  (PRN):  HYDROmorphone  Injectable 0.5 milliGRAM(s) IV Push every 2 hours PRN Pain  lanolin Ointment 1 Application(s) Topical every 6 hours PRN Sore Nipples  oxyCODONE    IR 5 milliGRAM(s) Oral every 4 hours PRN Severe Pain (7 - 10)  simethicone 80 milliGRAM(s) Chew every 4 hours PRN Gas      Physical Exam:  Constitutional: NAD, A+O x3  Abdomen: soft, nondistended, no guarding, no rebound, normal bowel sounds  Incision: clean, dry with pressure dressing in place  Extremities: no lower extremity edema or calf tenderness bilaterally      LABS:            9.0      16.01 )------------( 585        ( 25 @ 05:58 )            28.2            9.0      18.93 )------------( 552        ( 03-10-25 @ 14:20 )            27.5        138    |  106    |  x      ----------------------------<  x      3.5     |  x      |  x      03-10    137    |  102    |  11     ----------------------------<  84     4.1     |  18[L]  |  0.52     Ca    9.5        10 Mar 2025 14:20    TPro  7.9    /  Alb  3.2[L]  /  TBili  0.2    /  DBili  x      /  AST  32     /  ALT  41[H]  /  AlkPhos  220[H]  03-10          Urinalysis Basic - ( 10 Mar 2025 18:00 )    Color: Yellow / Appearance: Turbid / S.008 / pH: x  Gluc: x / Ketone: Negative mg/dL  / Bili: Negative / Urobili: 0.2 mg/dL   Blood: x / Protein: 30 mg/dL / Nitrite: Negative   Leuk Esterase: Large / RBC: 11 /HPF / WBC 1431 /HPF   Sq Epi: x / Non Sq Epi: 10 /HPF / Bacteria: Many /HPF

## 2025-03-14 NOTE — BH CONSULTATION LIAISON PROGRESS NOTE - MSE UNSTRUCTURED FT
The patient appears stated age, fair hygiene, dressed appropriately. She was calm, cooperative with the interview. She maintained appropriate eye contact. No psychomotor agitation or retardation. Steady gait. The patient's speech was fluent, normal in tone, rate, and volume. The patient's mood is "good." Affect is anxious, appropriate. The pts thoughts are goal directed. She denies any delusions or hallucinations. She denies any suicidal or homicidal ideation, intent, or plan. Insight is good. Judgement is good. Impulse control fair over this interval.

## 2025-03-14 NOTE — DISCHARGE NOTE NURSING/CASE MANAGEMENT/SOCIAL WORK - NSDCPEFALRISK_GEN_ALL_CORE
For information on Fall & Injury Prevention, visit: [email or web address] OR  [email or web address] OR  https://www.cdc.gov/steadi/patient.html

## 2025-03-14 NOTE — PROGRESS NOTE ADULT - ASSESSMENT
29y.o  POD15 s/p pLTCS for failed IOL/Cat 2 tracing c/b PP sPEC, now admitted for wound infection. CTAP (3/10) demonstrated a 17.1x2.2x3.7cm loculated subcutaneous fluid collection along the  site. Wound drained at bedside on 3/11. Patient is clinically stable, pain improving, remains afebrile    #Wound infection  - Vanc/Zosyn (3/10-)  - s/p bedside drainage 3/10  - f/u UA/BCx/Ucx/wound culture (3/10)  - Wound care consult    #sPEC  - Continue Procardia 30 daily    #Postpartum  - Regular diet  - PO Tylenol/Motrin  - HSQ for DVT ppx  - Continue home Synthroid, Zoljason Moss, PGY3 
29y.o  POD17 s/p pLTCS for failed IOL/Cat 2 tracing c/b PP sPEC, now admitted for wound infection. CTAP (3/10) demonstrated a 17.1x2.2x3.7cm loculated subcutaneous fluid collection along the  site. Wound drained at bedside on 3/11. Patient is clinically stable, pain improving, remains afebrile    #Wound infection  - Vanc/Zosyn (3/10-)  - s/p bedside drainage 3/10  - UCx (3/10): contaminated  - BCx (3/10): NGTD, f/u final  - f/u wound culture final (3/10): staph aureus, E faecalis  - Wound care: wound vac placed    #sPEC  - Continue Procardia 30 daily    #Postpartum  - Regular diet  - PO Tylenol/Motrin  - HSQ for DVT ppx  - Continue home Synthroid, Brook Moss, PGY3   
29y.o  POD18 s/p pLTCS for failed IOL/Cat 2 tracing c/b PP sPEC, now admitted for wound infection. CTAP (3/10) demonstrated a 17.1x2.2x3.7cm loculated subcutaneous fluid collection along the  site. Wound drained at bedside on 3/11. Patient is clinically stable, pain improving, remains afebrile    #Wound infection  - Augmentin (3/14-)  - Vanc/Zosyn (3/10-)  - s/p bedside drainage 3/10  - UCx (3/10): contaminated  - BCx (3/10): NGTD, f/u final  - f/u wound culture final (3/10): staph aureus, E faecalis  - Wound care: wound vac placed    #sPEC  - Continue Procardia 30 daily    #Postpartum  - Regular diet  - PO Tylenol/Motrin  - HSQ for DVT ppx  - Continue home Synthroid  - Psych: Zoloft 50    Gianna Moss, PGY3   
29y.o  POD16 s/p pLTCS for failed IOL/Cat 2 tracing c/b PP sPEC, now admitted for wound infection. CTAP (3/10) demonstrated a 17.1x2.2x3.7cm loculated subcutaneous fluid collection along the  site. Wound drained at bedside on 3/11. Patient is clinically stable, pain improving, remains afebrile    #Wound infection  - Vanc/Zosyn (3/10-)  - s/p bedside drainage 3/10  - UCx (3/10): contaminated  - BCx (3/10): NGTD, f/u final  - f/u wound culture (3/10)  - Wound care: wound vac placed    #sPEC  - Continue Procardia 30 daily    #Postpartum  - Regular diet  - PO Tylenol/Motrin  - HSQ for DVT ppx  - Continue home Synthroid, Zolofnajma Moss, PGY3

## 2025-03-14 NOTE — BH CONSULTATION LIAISON PROGRESS NOTE - NSBHCHARTREVIEWVS_PSY_A_CORE FT
Vital Signs Last 24 Hrs  T(C): 36.7 (14 Mar 2025 10:21), Max: 36.9 (14 Mar 2025 06:12)  T(F): 98 (14 Mar 2025 10:21), Max: 98.4 (14 Mar 2025 06:12)  HR: 83 (14 Mar 2025 10:21) (69 - 99)  BP: 136/81 (14 Mar 2025 10:21) (117/69 - 136/81)  BP(mean): --  RR: 18 (14 Mar 2025 10:21) (16 - 18)  SpO2: 98% (14 Mar 2025 10:21) (98% - 100%)    Parameters below as of 14 Mar 2025 10:21  Patient On (Oxygen Delivery Method): room air

## 2025-03-14 NOTE — BH CONSULTATION LIAISON PROGRESS NOTE - NSBHASSESSMENTFT_PSY_ALL_CORE
Spoke with pt and gave him Np's advice   Patient is a 29 year old female  gave birth via  on , no formal PPHx but reports increased anxiety during pregnancy, readmitted to the hospital for treatment of operative wound infection, psychiatry consulted for ongoing depressive symptoms. Given timeline of pt sxs likely with  anxiety disorder, current mood appears to be more low given stressful dynamics at home. Pt reporting some sxs of MDD including passive suicidality, low mood, however denies anhedonia and does not display any neurovegetative sxs of depression. Clinically appears more anxious and notes she spends a lot of time worrying about how she will move forward with her  refusing to speak with her family, however pt conceptualizes her sxs as being more depressed. Started on sertraline 25 mg qD by OB team, tolerating well.    3/14: Pt in good spirits, denies SI/HI, continues to tolerate sertraline. no psychiatric contraindications to DC    PLAN  - defer obs status to primary team, no psychiatric indication for 1:1 observation  - c/w sertraline to 50 mg qD for treatment of anxiety and depression  - can use ativan 0.5 mg q6h PRN severe anxiety while in hospital setting  - pt amenable to following up outpatient with psychiatry, will contact [facility] to assess feasibility  - c/w with outpatient therapy at [place]  - [facility] [phone] (M-F 9am-7pm)   - [facility] [phone]

## 2025-03-14 NOTE — DISCHARGE NOTE PROVIDER - CARE PROVIDER_API CALL
Abi Najera  Obstetrics and Gynecology  [address] [place] [id number]  Phone: [phone]  Fax: [phone]  Follow Up Time:

## 2025-03-14 NOTE — DISCHARGE NOTE NURSING/CASE MANAGEMENT/SOCIAL WORK - NSSCTYPOFSERV_GEN_ALL_CORE
Wound care three times per week. Visiting Nurse to visit patient the day after discharge. Visiting  Nurse to call patient to schedule visit time.

## 2025-03-14 NOTE — DISCHARGE NOTE PROVIDER - NSDCHHHOMEBOUNDOTHER_GEN_ALL_CORE_FT
Home with home healthcare 3 times a week for wound care and assessment. Will be assessed at OB office once a week.  Topical recommendations:   Transverse abdomen: Cleanse with NS, pat dry. Apply Liquid barrier film to periwound skin (allow to dry). Pack wound with hydrofiber ribbon (aquacel ribbon) leaving 2" exposed for retrieval. Secure with silicone foam with borders, change every other day and PRN if soiled.  Home with home healthcare 3 times a week for wound care and assessment. Wound care and assessment  at OB office once a week.

## 2025-03-14 NOTE — DISCHARGE NOTE PROVIDER - NSDCFUSCHEDAPPT_GEN_ALL_CORE_FT
[facility] [address]  Scheduled Appointment: 03/19/2025    [facility]  INTMED [address]  Scheduled Appointment: 03/19/2025    Cleve Mccurdy  [facility]  CARDIOLOGY [address]  Scheduled Appointment: 03/25/2025    Nely Tripathi  [facility]  INTMED [address]  Scheduled Appointment: 04/01/2025    [facility]  ENDOCRIN [address]  Scheduled Appointment: 04/16/2025

## 2025-03-14 NOTE — DISCHARGE NOTE NURSING/CASE MANAGEMENT/SOCIAL WORK - PATIENT PORTAL LINK FT
You can access the FollowMyHealth Patient Portal offered by [facility] by registering at the following website: [email or web address] By joining [facility]’s FollowMyHealth portal, you will also be able to view your health information using other applications (apps) compatible with our system.

## 2025-03-14 NOTE — ADVANCED PRACTICE NURSE CONSULT - RECOMMEDATIONS
Supplies provided for discharge:  Aquacel hydrofiber ribbon   mepilex foam dressing #[id number]   liquid barrier film  adhesive remover  gauze, saline, qtips     Please contact Wound/Ostomy Care Service Line if we can be of further assistance (ext [phone]).

## 2025-03-14 NOTE — DISCHARGE NOTE PROVIDER - NSDCMRMEDTOKEN_GEN_ALL_CORE_FT
acetaminophen 325 mg oral tablet: 3 tab(s) orally every 6 hours as needed for  moderate pain  amoxicillin-clavulanate 875 mg-125 mg oral tablet: 1 tab(s) orally every 12 hours  ferrous sulfate 325 mg (65 mg elemental iron) oral tablet: 1 tab(s) orally once a day  ibuprofen 600 mg oral tablet: 1 tab(s) orally every 6 hours as needed for  moderate pain  NIFEdipine 30 mg oral tablet, extended release: 1 tab(s) orally once a day Hold if BP &lt;110/60 MDD: 1  Prenatal Multivitamins with Folic Acid 1 mg oral tablet: 1 tab(s) orally once a day  sertraline 50 mg oral tablet: 1 tab(s) orally once a day   acetaminophen 325 mg oral tablet: 3 tab(s) orally every 6 hours as needed for  moderate pain  amoxicillin-clavulanate 875 mg-125 mg oral tablet: 1 tab(s) orally every 12 hours  amoxicillin-clavulanate 875 mg-125 mg oral tablet: 1 tab(s) orally every 12 hours MDD: 2  ferrous sulfate 325 mg (65 mg elemental iron) oral tablet: 1 tab(s) orally once a day  ibuprofen 600 mg oral tablet: 1 tab(s) orally every 6 hours as needed for  moderate pain  levothyroxine 50 mcg (0.05 mg) oral tablet: 1 tab(s) orally once a day  NIFEdipine 30 mg oral tablet, extended release: 1 tab(s) orally once a day Hold if BP &lt;110/60 MDD: 1  oxyCODONE 5 mg oral tablet: 1 tab(s) orally every 4 hours as needed for Severe Pain (7 - 10) MDD: 1  Prenatal Multivitamins with Folic Acid 1 mg oral tablet: 1 tab(s) orally once a day  sertraline 50 mg oral tablet: 1 tab(s) orally once a day  sertraline 50 mg oral tablet: 1 tab(s) orally once a day

## 2025-03-14 NOTE — BH CONSULTATION LIAISON PROGRESS NOTE - CURRENT MEDICATION
MEDICATIONS  (STANDING):  acetaminophen     Tablet .. 975 milliGRAM(s) Oral every 6 hours  amoxicillin  875 milliGRAM(s)/clavulanate 1 Tablet(s) Oral every 12 hours  heparin   Injectable 5000 Unit(s) SubCutaneous every 12 hours  ibuprofen  Tablet. 600 milliGRAM(s) Oral every 6 hours  levothyroxine 50 MICROGram(s) Oral daily  NIFEdipine XL 30 milliGRAM(s) Oral daily  sertraline 50 milliGRAM(s) Oral daily    MEDICATIONS  (PRN):  HYDROmorphone  Injectable 0.5 milliGRAM(s) IV Push every 2 hours PRN Pain  lanolin Ointment 1 Application(s) Topical every 6 hours PRN Sore Nipples  oxyCODONE    IR 5 milliGRAM(s) Oral every 4 hours PRN Severe Pain (7 - 10)  simethicone 80 milliGRAM(s) Chew every 4 hours PRN Gas

## 2025-03-14 NOTE — ADVANCED PRACTICE NURSE CONSULT - REASON FOR CONSULT
Patient seen for follow up of abdominal wound. Patient wishes to discuss alternative dressing recommendations. 
Patient seen on skin care rounds after wound care referral received for assessment of skin impairment and recommendations of topical management of a transverse abdomen surgical wound s/p . As per H&P, patient is a 29y.o  POD14 s/p pLTCS for failed IOL/Cat 2 tracing presenting with low grade fevers at home and pain/ drainage from incision site. Of note, pt recent readmitted for sPEC and is s/p Mg from 3/3-3/5. Pt began experiencing pain at her incision site this morning and noticed white drainage from right side. Pt had visiting nurse at home for blood pressure management who recommended she come to the ED. Pt with chills at home, has taken her temp with highest temp recorded 100F. Pt denies chest pain, sob, lightheadedness. Endorsing some dizziness this morning. Is feeling anxious over post-partum complications. 
Patient seen for wound education.

## 2025-03-14 NOTE — ADVANCED PRACTICE NURSE CONSULT - ASSESSMENT
Pastient a&ox4, OOB self. Transverse abdominal wound dry and intact. Reviewed and recorded wound care procedure with patient, supplies provided for home, all questions answered. See A&I flowsheet for full assessment details. 
Patient a&ox4, discussed alternative dressing of hydrofiber and foam dressings every other day as an alternative to NPWT VAC therapy. Reviewed indications for use of hydrofiber and wound care procedure. Dr. Vickers at bedside for wound assessment. Patient agreeable to try alternative dressing, NPWT VAC dressing taken down, wound dressed with hydrofiber and foam. See A&I flowsheet for full assessment details. Patient educated on importance of role of nutrition and protein intake, glucose control on wound healing. All questions answered. Patient demonstrated understanding through teachback. 
General: A&Ox4, OOB self w/ standby, continent of urine and stool. Skin warm, dry with increased moisture in intertriginous folds, adequate skin turgor, scattered areas of hyperpigmentation and hypopigmentation, scattered areas of ecchymosis on bilateral upper extremities with no hematomas. Blanchable erythema on bilateral heels.     Midline transverse abdomen: Surgical wound s/p c section measuring [id number] with 100% red moist friable granular tissue with small serosanguinous drainage, no odor. Tunneling noted at 3 o'clock and 9 o'clock, 3.5cm @ 3, 6.5cm@ 9 o'clock, Periwound intact. No induration, no erythema, no crepitus, no fluctuance, no edema, no temperature changes, no overt signs of infection noted.     NPWT/VAC applied at bedside; please see A&I flowsheet for full assessment including tissue type,

## 2025-03-14 NOTE — PROGRESS NOTE ADULT - REASON FOR ADMISSION
postoperative incisional abscess/drainage

## 2025-03-14 NOTE — DISCHARGE NOTE PROVIDER - NSDCFUADDAPPT_GEN_ALL_CORE_FT
Topical recommendations:   Transverse abdomen: Cleanse with NS, pat dry. Apply Liquid barrier film to periwound skin (allow to dry). Pack wound with hydrofiber ribbon (aquacel ribbon) leaving 2" exposed for retrieval. Secure with silicone foam with borders, change every other day and PRN if soiled.  Home with home healthcare 3 times a week for wound care and assessment. Wound care and assessment  at OB office once a week.

## 2025-03-14 NOTE — DISCHARGE NOTE NURSING/CASE MANAGEMENT/SOCIAL WORK - FINANCIAL ASSISTANCE
[facility] provides services at a reduced cost to those who are determined to be eligible through [facility]’s financial assistance program. Information regarding [facility]’s financial assistance program can be found by going to [email or web address] or by calling [phone].

## 2025-03-14 NOTE — BH CONSULTATION LIAISON PROGRESS NOTE - NSBHCONSULTFOLLOWAFTERCARE_PSY_A_CORE FT
Pt can call [facility], [phone] to set up appointment, case d/w  clinic

## 2025-03-15 LAB
CULTURE RESULTS: SIGNIFICANT CHANGE UP
CULTURE RESULTS: SIGNIFICANT CHANGE UP
SPECIMEN SOURCE: SIGNIFICANT CHANGE UP
SPECIMEN SOURCE: SIGNIFICANT CHANGE UP

## 2025-03-16 LAB
-  AMPICILLIN: SIGNIFICANT CHANGE UP
-  VANCOMYCIN: SIGNIFICANT CHANGE UP
CULTURE RESULTS: ABNORMAL
METHOD TYPE: SIGNIFICANT CHANGE UP
ORGANISM # SPEC MICROSCOPIC CNT: ABNORMAL
SPECIMEN SOURCE: SIGNIFICANT CHANGE UP

## 2025-03-17 DIAGNOSIS — O14.90 UNSPECIFIED PRE-ECLAMPSIA, UNSPECIFIED TRIMESTER: ICD-10-CM

## 2025-03-17 RX ORDER — NIFEDIPINE 30 MG/1
30 TABLET, FILM COATED, EXTENDED RELEASE ORAL
Refills: 0 | Status: ACTIVE | COMMUNITY

## 2025-03-19 ENCOUNTER — APPOINTMENT (OUTPATIENT)
Age: 30
End: 2025-03-19
Payer: COMMERCIAL

## 2025-03-19 ENCOUNTER — NON-APPOINTMENT (OUTPATIENT)
Age: 30
End: 2025-03-19

## 2025-03-19 ENCOUNTER — APPOINTMENT (OUTPATIENT)
Dept: INTERNAL MEDICINE | Facility: CLINIC | Age: 30
End: 2025-03-19
Payer: COMMERCIAL

## 2025-03-19 VITALS
HEIGHT: 57 IN | DIASTOLIC BLOOD PRESSURE: 76 MMHG | BODY MASS INDEX: 26.64 KG/M2 | OXYGEN SATURATION: 97 % | SYSTOLIC BLOOD PRESSURE: 109 MMHG | WEIGHT: 123.5 LBS | HEART RATE: 82 BPM

## 2025-03-19 DIAGNOSIS — T81.49XA INFECTION FOLLOWING A PROCEDURE, OTHER SURGICAL SITE, INITIAL ENCOUNTER: ICD-10-CM

## 2025-03-19 DIAGNOSIS — E03.9 HYPOTHYROIDISM, UNSPECIFIED: ICD-10-CM

## 2025-03-19 DIAGNOSIS — E28.2 POLYCYSTIC OVARIAN SYNDROME: ICD-10-CM

## 2025-03-19 DIAGNOSIS — Z78.9 OTHER SPECIFIED HEALTH STATUS: ICD-10-CM

## 2025-03-19 DIAGNOSIS — Z86.32 PERSONAL HISTORY OF GESTATIONAL DIABETES: ICD-10-CM

## 2025-03-19 PROCEDURE — 99204 OFFICE O/P NEW MOD 45 MIN: CPT

## 2025-03-19 PROCEDURE — S9443: CPT | Mod: 95

## 2025-03-19 RX ORDER — SERTRALINE HYDROCHLORIDE 50 MG/1
50 TABLET, FILM COATED ORAL DAILY
Refills: 0 | Status: ACTIVE | COMMUNITY

## 2025-03-19 RX ORDER — ACETAMINOPHEN 500 MG/1
500 TABLET ORAL
Refills: 0 | Status: ACTIVE | COMMUNITY

## 2025-03-19 RX ORDER — AMOXICILLIN 875 MG/1
875 TABLET, FILM COATED ORAL
Refills: 0 | Status: ACTIVE | COMMUNITY

## 2025-03-19 RX ORDER — OXYCODONE HYDROCHLORIDE 5 MG/1
5 CAPSULE ORAL EVERY 6 HOURS
Refills: 0 | Status: ACTIVE | COMMUNITY

## 2025-03-20 ENCOUNTER — TRANSCRIPTION ENCOUNTER (OUTPATIENT)
Age: 30
End: 2025-03-20

## 2025-03-25 ENCOUNTER — NON-APPOINTMENT (OUTPATIENT)
Age: 30
End: 2025-03-25

## 2025-03-25 ENCOUNTER — APPOINTMENT (OUTPATIENT)
Dept: CARDIOLOGY | Facility: CLINIC | Age: 30
End: 2025-03-25

## 2025-03-25 VITALS
OXYGEN SATURATION: 99 % | WEIGHT: 123 LBS | SYSTOLIC BLOOD PRESSURE: 114 MMHG | HEIGHT: 57 IN | DIASTOLIC BLOOD PRESSURE: 73 MMHG | HEART RATE: 84 BPM | BODY MASS INDEX: 26.54 KG/M2

## 2025-03-25 PROCEDURE — 93000 ELECTROCARDIOGRAM COMPLETE: CPT

## 2025-03-25 PROCEDURE — 99204 OFFICE O/P NEW MOD 45 MIN: CPT | Mod: 25

## 2025-03-26 ENCOUNTER — APPOINTMENT (OUTPATIENT)
Age: 30
End: 2025-03-26
Payer: COMMERCIAL

## 2025-03-26 PROCEDURE — S9443: CPT | Mod: 95

## 2025-04-01 ENCOUNTER — APPOINTMENT (OUTPATIENT)
Dept: INTERNAL MEDICINE | Facility: CLINIC | Age: 30
End: 2025-04-01

## 2025-04-16 ENCOUNTER — APPOINTMENT (OUTPATIENT)
Dept: ENDOCRINOLOGY | Facility: CLINIC | Age: 30
End: 2025-04-16

## 2025-04-16 VITALS
OXYGEN SATURATION: 97 % | HEART RATE: 68 BPM | SYSTOLIC BLOOD PRESSURE: 120 MMHG | HEIGHT: 57 IN | TEMPERATURE: 97 F | DIASTOLIC BLOOD PRESSURE: 70 MMHG | BODY MASS INDEX: 26.83 KG/M2 | WEIGHT: 124.38 LBS

## 2025-04-16 DIAGNOSIS — E55.9 VITAMIN D DEFICIENCY, UNSPECIFIED: ICD-10-CM

## 2025-04-16 DIAGNOSIS — R73.03 PREDIABETES.: ICD-10-CM

## 2025-04-16 DIAGNOSIS — E03.9 HYPOTHYROIDISM, UNSPECIFIED: ICD-10-CM

## 2025-04-16 DIAGNOSIS — E28.2 POLYCYSTIC OVARIAN SYNDROME: ICD-10-CM

## 2025-04-16 PROCEDURE — 99214 OFFICE O/P EST MOD 30 MIN: CPT

## 2025-04-16 PROCEDURE — 36415 COLL VENOUS BLD VENIPUNCTURE: CPT

## 2025-04-16 PROCEDURE — G2211 COMPLEX E/M VISIT ADD ON: CPT | Mod: NC

## 2025-04-17 LAB
25(OH)D3 SERPL-MCNC: 50.6 NG/ML
ALBUMIN SERPL ELPH-MCNC: 4.7 G/DL
ALP BLD-CCNC: 94 U/L
ALT SERPL-CCNC: 15 U/L
ANION GAP SERPL CALC-SCNC: 16 MMOL/L
AST SERPL-CCNC: 18 U/L
BASOPHILS # BLD AUTO: 0.09 K/UL
BASOPHILS NFR BLD AUTO: 0.9 %
BILIRUB SERPL-MCNC: 0.2 MG/DL
BUN SERPL-MCNC: 25 MG/DL
CALCIUM SERPL-MCNC: 9.4 MG/DL
CHLORIDE SERPL-SCNC: 101 MMOL/L
CHOLEST SERPL-MCNC: 196 MG/DL
CO2 SERPL-SCNC: 19 MMOL/L
CREAT SERPL-MCNC: 0.85 MG/DL
CREAT SPEC-SCNC: 53 MG/DL
EGFRCR SERPLBLD CKD-EPI 2021: 94 ML/MIN/1.73M2
EOSINOPHIL # BLD AUTO: 0.1 K/UL
EOSINOPHIL NFR BLD AUTO: 1 %
ESTIMATED AVERAGE GLUCOSE: 100 MG/DL
FERRITIN SERPL-MCNC: 112 NG/ML
FOLATE SERPL-MCNC: >20 NG/ML
GLUCOSE SERPL-MCNC: 78 MG/DL
HBA1C MFR BLD HPLC: 5.1 %
HCT VFR BLD CALC: 34.2 %
HDLC SERPL-MCNC: 55 MG/DL
HGB BLD-MCNC: 10.9 G/DL
IMM GRANULOCYTES NFR BLD AUTO: 0.4 %
IRON SERPL-MCNC: 53 UG/DL
LDLC SERPL DIRECT ASSAY-MCNC: 115 MG/DL
LYMPHOCYTES # BLD AUTO: 2.25 K/UL
LYMPHOCYTES NFR BLD AUTO: 22.5 %
MAGNESIUM SERPL-MCNC: 2.2 MG/DL
MAN DIFF?: NORMAL
MCHC RBC-ENTMCNC: 29.4 PG
MCHC RBC-ENTMCNC: 31.9 G/DL
MCV RBC AUTO: 92.2 FL
MICROALBUMIN 24H UR DL<=1MG/L-MCNC: 1.3 MG/DL
MICROALBUMIN/CREAT 24H UR-RTO: 25 MG/G
MONOCYTES # BLD AUTO: 0.58 K/UL
MONOCYTES NFR BLD AUTO: 5.8 %
NEUTROPHILS # BLD AUTO: 6.94 K/UL
NEUTROPHILS NFR BLD AUTO: 69.4 %
PLATELET # BLD AUTO: 373 K/UL
POTASSIUM SERPL-SCNC: 4.6 MMOL/L
PROT SERPL-MCNC: 7.8 G/DL
RBC # BLD: 3.71 M/UL
RBC # FLD: 14.1 %
SODIUM SERPL-SCNC: 137 MMOL/L
T3FREE SERPL-MCNC: 2.47 PG/ML
T4 FREE SERPL-MCNC: 1.4 NG/DL
TRIGL SERPL-MCNC: 98 MG/DL
TSH SERPL-ACNC: 1.91 UIU/ML
VIT B12 SERPL-MCNC: 1601 PG/ML
WBC # FLD AUTO: 10 K/UL

## 2025-06-11 ENCOUNTER — APPOINTMENT (OUTPATIENT)
Dept: INTERNAL MEDICINE | Facility: CLINIC | Age: 30
End: 2025-06-11
Payer: COMMERCIAL

## 2025-06-11 VITALS
WEIGHT: 136 LBS | BODY MASS INDEX: 29.34 KG/M2 | SYSTOLIC BLOOD PRESSURE: 117 MMHG | DIASTOLIC BLOOD PRESSURE: 76 MMHG | OXYGEN SATURATION: 98 % | HEART RATE: 83 BPM | HEIGHT: 57 IN

## 2025-06-11 PROCEDURE — G2211 COMPLEX E/M VISIT ADD ON: CPT | Mod: NC

## 2025-06-11 PROCEDURE — 99213 OFFICE O/P EST LOW 20 MIN: CPT

## 2025-06-11 RX ORDER — FERROUS GLUCONATE 256(28)MG
256 (28 FE) TABLET ORAL
Refills: 0 | Status: ACTIVE | COMMUNITY

## 2025-06-11 RX ORDER — OMEPRAZOLE 40 MG/1
40 CAPSULE, DELAYED RELEASE ORAL
Qty: 90 | Refills: 1 | Status: ACTIVE | COMMUNITY
Start: 2025-06-11 | End: 1900-01-01

## 2025-06-11 RX ORDER — HYDROCORTISONE 1 %
SOLUTION, NON-ORAL TOPICAL
Refills: 0 | Status: ACTIVE | COMMUNITY

## 2025-06-24 ENCOUNTER — APPOINTMENT (OUTPATIENT)
Dept: CARDIOLOGY | Facility: CLINIC | Age: 30
End: 2025-06-24

## 2025-06-24 VITALS
BODY MASS INDEX: 29.99 KG/M2 | HEIGHT: 57 IN | DIASTOLIC BLOOD PRESSURE: 80 MMHG | OXYGEN SATURATION: 100 % | WEIGHT: 139 LBS | SYSTOLIC BLOOD PRESSURE: 120 MMHG | HEART RATE: 86 BPM

## 2025-06-24 PROCEDURE — 93000 ELECTROCARDIOGRAM COMPLETE: CPT

## 2025-06-24 PROCEDURE — 99214 OFFICE O/P EST MOD 30 MIN: CPT | Mod: 25

## 2025-08-07 ENCOUNTER — NON-APPOINTMENT (OUTPATIENT)
Age: 30
End: 2025-08-07

## 2025-08-07 ENCOUNTER — APPOINTMENT (OUTPATIENT)
Dept: DERMATOLOGY | Facility: CLINIC | Age: 30
End: 2025-08-07
Payer: COMMERCIAL

## 2025-08-07 VITALS — HEIGHT: 57 IN | WEIGHT: 144 LBS | BODY MASS INDEX: 31.07 KG/M2

## 2025-08-07 DIAGNOSIS — L91.0 HYPERTROPHIC SCAR: ICD-10-CM

## 2025-08-07 DIAGNOSIS — L85.3 XEROSIS CUTIS: ICD-10-CM

## 2025-08-07 DIAGNOSIS — L30.9 DERMATITIS, UNSPECIFIED: ICD-10-CM

## 2025-08-07 PROCEDURE — 11900 INJECT SKIN LESIONS </W 7: CPT

## 2025-08-07 PROCEDURE — 99204 OFFICE O/P NEW MOD 45 MIN: CPT | Mod: 25

## 2025-08-07 RX ORDER — TRIAMCINOLONE ACETONIDE 1 MG/G
0.1 OINTMENT TOPICAL
Qty: 1 | Refills: 3 | Status: ACTIVE | COMMUNITY
Start: 2025-08-07 | End: 1900-01-01

## 2025-08-11 ENCOUNTER — APPOINTMENT (OUTPATIENT)
Dept: OTOLARYNGOLOGY | Facility: CLINIC | Age: 30
End: 2025-08-11
Payer: COMMERCIAL

## 2025-08-11 VITALS
WEIGHT: 145 LBS | SYSTOLIC BLOOD PRESSURE: 126 MMHG | HEART RATE: 101 BPM | BODY MASS INDEX: 31.28 KG/M2 | HEIGHT: 57 IN | DIASTOLIC BLOOD PRESSURE: 76 MMHG

## 2025-08-11 DIAGNOSIS — H90.3 SENSORINEURAL HEARING LOSS, BILATERAL: ICD-10-CM

## 2025-08-11 DIAGNOSIS — J34.2 DEVIATED NASAL SEPTUM: ICD-10-CM

## 2025-08-11 DIAGNOSIS — H93.13 TINNITUS, BILATERAL: ICD-10-CM

## 2025-08-11 DIAGNOSIS — H93.293 OTHER ABNORMAL AUDITORY PERCEPTIONS, BILATERAL: ICD-10-CM

## 2025-08-11 DIAGNOSIS — J31.0 CHRONIC RHINITIS: ICD-10-CM

## 2025-08-11 DIAGNOSIS — K21.9 GASTRO-ESOPHAGEAL REFLUX DISEASE W/OUT ESOPHAGITIS: ICD-10-CM

## 2025-08-11 DIAGNOSIS — R42 DIZZINESS AND GIDDINESS: ICD-10-CM

## 2025-08-11 DIAGNOSIS — H81.02 MENIERE'S DISEASE, LEFT EAR: ICD-10-CM

## 2025-08-11 PROCEDURE — 99204 OFFICE O/P NEW MOD 45 MIN: CPT | Mod: 25

## 2025-08-11 PROCEDURE — 92557 COMPREHENSIVE HEARING TEST: CPT

## 2025-08-11 PROCEDURE — 31231 NASAL ENDOSCOPY DX: CPT

## 2025-08-11 PROCEDURE — 92567 TYMPANOMETRY: CPT

## (undated) DEVICE — DRSG DERMABOND PRINEO 22CM